# Patient Record
Sex: FEMALE | Race: BLACK OR AFRICAN AMERICAN | NOT HISPANIC OR LATINO | Employment: UNEMPLOYED | URBAN - METROPOLITAN AREA
[De-identification: names, ages, dates, MRNs, and addresses within clinical notes are randomized per-mention and may not be internally consistent; named-entity substitution may affect disease eponyms.]

---

## 2017-04-26 ENCOUNTER — OFFICE VISIT - HEALTHEAST (OUTPATIENT)
Dept: FAMILY MEDICINE | Facility: CLINIC | Age: 27
End: 2017-04-26

## 2017-04-26 DIAGNOSIS — N76.0 BACTERIAL VAGINOSIS: ICD-10-CM

## 2017-04-26 DIAGNOSIS — B96.89 BACTERIAL VAGINOSIS: ICD-10-CM

## 2017-04-26 DIAGNOSIS — B00.9 HSV-2 INFECTION: ICD-10-CM

## 2017-04-26 DIAGNOSIS — Z30.09 CONTRACEPTIVE EDUCATION: ICD-10-CM

## 2017-07-11 ENCOUNTER — COMMUNICATION - HEALTHEAST (OUTPATIENT)
Dept: SCHEDULING | Facility: CLINIC | Age: 27
End: 2017-07-11

## 2017-08-17 ENCOUNTER — OFFICE VISIT - HEALTHEAST (OUTPATIENT)
Dept: FAMILY MEDICINE | Facility: CLINIC | Age: 27
End: 2017-08-17

## 2017-08-17 ENCOUNTER — HOSPITAL ENCOUNTER (OUTPATIENT)
Dept: LAB | Age: 27
Setting detail: SPECIMEN
Discharge: HOME | End: 2017-08-17

## 2017-08-17 DIAGNOSIS — B96.89 BV (BACTERIAL VAGINOSIS): ICD-10-CM

## 2017-08-17 DIAGNOSIS — N89.8 VAGINAL DISCHARGE: ICD-10-CM

## 2017-08-17 DIAGNOSIS — N90.89 LESION OF LABIA: ICD-10-CM

## 2017-08-17 DIAGNOSIS — Z11.3 SCREEN FOR STD (SEXUALLY TRANSMITTED DISEASE): ICD-10-CM

## 2017-08-17 DIAGNOSIS — N76.0 BV (BACTERIAL VAGINOSIS): ICD-10-CM

## 2017-08-17 LAB
HBV SURFACE AG SERPL QL IA: NEGATIVE
HIV 1+2 AB+HIV1 P24 AG SERPL QL IA: NEGATIVE

## 2017-08-18 LAB
HSV1 IGG SERPL QL IA: POSITIVE
HSV2 IGG SERPL QL IA: ABNORMAL
SYPHILIS RPR SCREEN - HISTORICAL: NORMAL

## 2017-10-12 ENCOUNTER — COMMUNICATION - HEALTHEAST (OUTPATIENT)
Dept: FAMILY MEDICINE | Facility: CLINIC | Age: 27
End: 2017-10-12

## 2017-10-12 DIAGNOSIS — N90.89 LESION OF LABIA: ICD-10-CM

## 2018-01-31 ENCOUNTER — OFFICE VISIT - HEALTHEAST (OUTPATIENT)
Dept: FAMILY MEDICINE | Facility: CLINIC | Age: 28
End: 2018-01-31

## 2018-01-31 DIAGNOSIS — A60.00 GENITAL HERPES: ICD-10-CM

## 2018-01-31 DIAGNOSIS — Z11.3 SCREEN FOR STD (SEXUALLY TRANSMITTED DISEASE): ICD-10-CM

## 2018-01-31 LAB
CLUE CELLS: NORMAL
TRICHOMONAS, WET PREP: NORMAL
YEAST, WET PREP: NORMAL

## 2018-02-01 ENCOUNTER — COMMUNICATION - HEALTHEAST (OUTPATIENT)
Dept: FAMILY MEDICINE | Facility: CLINIC | Age: 28
End: 2018-02-01

## 2018-02-01 LAB
C TRACH DNA SPEC QL PROBE+SIG AMP: NEGATIVE
N GONORRHOEA DNA SPEC QL NAA+PROBE: NEGATIVE

## 2018-02-05 LAB
BKR LAB AP ABNORMAL BLEEDING: NO
BKR LAB AP BIRTH CONTROL/HORMONES: ABNORMAL
BKR LAB AP CERVICAL APPEARANCE: NORMAL
BKR LAB AP GYN ADEQUACY: ABNORMAL
BKR LAB AP GYN INTERPRETATION: ABNORMAL
BKR LAB AP HPV REFLEX: ABNORMAL
BKR LAB AP LMP: ABNORMAL
BKR LAB AP PATIENT STATUS: ABNORMAL
BKR LAB AP PREVIOUS ABNORMAL: ABNORMAL
BKR LAB AP PREVIOUS NORMAL: ABNORMAL
HIGH RISK?: NO
HPV SOURCE: ABNORMAL
HUMAN PAPILLOMA VIRUS 16 DNA: NEGATIVE
HUMAN PAPILLOMA VIRUS 18 DNA: NEGATIVE
HUMAN PAPILLOMA VIRUS FINAL DIAGNOSIS: ABNORMAL
HUMAN PAPILLOMA VIRUS OTHER HR: POSITIVE
PATH REPORT.COMMENTS IMP SPEC: ABNORMAL
RESULT FLAG (HE HISTORICAL CONVERSION): ABNORMAL
SPECIMEN DESCRIPTION: ABNORMAL

## 2018-02-07 ENCOUNTER — COMMUNICATION - HEALTHEAST (OUTPATIENT)
Dept: FAMILY MEDICINE | Facility: CLINIC | Age: 28
End: 2018-02-07

## 2018-06-21 ENCOUNTER — COMMUNICATION - HEALTHEAST (OUTPATIENT)
Dept: FAMILY MEDICINE | Facility: CLINIC | Age: 28
End: 2018-06-21

## 2018-07-25 LAB
ABORH_EXT (HISTORICAL CONVERSION): NORMAL
ANTIBODY_EXT (HISTORICAL CONVERSION): NORMAL
HBSAG_EXT (HISTORICAL CONVERSION): NEGATIVE
HIV_EXT: NEGATIVE
RPR - HISTORICAL: NORMAL
RUBELLA_EXT (HISTORICAL CONVERSION): NORMAL

## 2018-10-03 LAB
HGB_EXT (HISTORICAL CONVERSION): 10.9
PLT_EXT - HISTORICAL: 314

## 2018-11-01 ENCOUNTER — RECORDS - HEALTHEAST (OUTPATIENT)
Dept: ADMINISTRATIVE | Facility: OTHER | Age: 28
End: 2018-11-01

## 2018-11-28 ENCOUNTER — COMMUNICATION - HEALTHEAST (OUTPATIENT)
Dept: FAMILY MEDICINE | Facility: CLINIC | Age: 28
End: 2018-11-28

## 2018-12-10 ENCOUNTER — HOSPITAL ENCOUNTER (OUTPATIENT)
Dept: MEDSURG UNIT | Facility: CLINIC | Age: 28
Discharge: HOME | End: 2018-12-10
Attending: FAMILY MEDICINE | Admitting: FAMILY MEDICINE

## 2018-12-10 ASSESSMENT — MIFFLIN-ST. JEOR: SCORE: 1614

## 2019-01-10 ENCOUNTER — COMMUNICATION - HEALTHEAST (OUTPATIENT)
Dept: FAMILY MEDICINE | Facility: CLINIC | Age: 29
End: 2019-01-10

## 2019-01-14 ENCOUNTER — PRENATAL OFFICE VISIT - HEALTHEAST (OUTPATIENT)
Dept: FAMILY MEDICINE | Facility: CLINIC | Age: 29
End: 2019-01-14

## 2019-01-14 DIAGNOSIS — D57.3 SICKLE-CELL TRAIT (H): ICD-10-CM

## 2019-01-14 DIAGNOSIS — O34.219 PREVIOUS CESAREAN DELIVERY AFFECTING PREGNANCY: ICD-10-CM

## 2019-01-14 DIAGNOSIS — Z34.93 ENCOUNTER FOR SUPERVISION OF NORMAL PREGNANCY IN THIRD TRIMESTER: ICD-10-CM

## 2019-01-14 DIAGNOSIS — Z12.4 ENCOUNTER FOR SCREENING FOR MALIGNANT NEOPLASM OF CERVIX: ICD-10-CM

## 2019-01-14 DIAGNOSIS — N92.6 ABNORMAL MENSES: ICD-10-CM

## 2019-01-14 DIAGNOSIS — A60.00 GENITAL HERPES SIMPLEX, UNSPECIFIED SITE: ICD-10-CM

## 2019-01-14 DIAGNOSIS — F41.9 ANXIETY: ICD-10-CM

## 2019-01-14 DIAGNOSIS — Z32.01 PREGNANCY TEST POSITIVE: ICD-10-CM

## 2019-01-14 LAB
ALBUMIN UR-MCNC: NEGATIVE MG/DL
GLUCOSE UR STRIP-MCNC: NEGATIVE MG/DL
HCG UR QL: POSITIVE
KETONES UR STRIP-MCNC: NEGATIVE MG/DL

## 2019-01-14 ASSESSMENT — MIFFLIN-ST. JEOR: SCORE: 1624.21

## 2019-01-18 ENCOUNTER — COMMUNICATION - HEALTHEAST (OUTPATIENT)
Dept: FAMILY MEDICINE | Facility: CLINIC | Age: 29
End: 2019-01-18

## 2019-01-29 ENCOUNTER — PRENATAL OFFICE VISIT - HEALTHEAST (OUTPATIENT)
Dept: FAMILY MEDICINE | Facility: CLINIC | Age: 29
End: 2019-01-29

## 2019-01-29 DIAGNOSIS — O99.013 ANEMIA DURING PREGNANCY IN THIRD TRIMESTER: ICD-10-CM

## 2019-01-29 DIAGNOSIS — O34.219 PREVIOUS CESAREAN DELIVERY AFFECTING PREGNANCY: ICD-10-CM

## 2019-01-29 DIAGNOSIS — A60.00 GENITAL HERPES SIMPLEX, UNSPECIFIED SITE: ICD-10-CM

## 2019-01-29 DIAGNOSIS — K21.9 GASTROESOPHAGEAL REFLUX DISEASE, ESOPHAGITIS PRESENCE NOT SPECIFIED: ICD-10-CM

## 2019-01-29 DIAGNOSIS — B96.89 BACTERIAL VAGINITIS: ICD-10-CM

## 2019-01-29 DIAGNOSIS — N76.0 BACTERIAL VAGINITIS: ICD-10-CM

## 2019-01-29 LAB
FASTING STATUS PATIENT QL REPORTED: NO
GLUCOSE 1H P 50 G GLC PO SERPL-MCNC: 87 MG/DL (ref 70–139)
HGB BLD-MCNC: 9.7 G/DL (ref 12–16)

## 2019-01-30 LAB — T PALLIDUM AB SER QL: NEGATIVE

## 2019-01-31 ENCOUNTER — AMBULATORY - HEALTHEAST (OUTPATIENT)
Dept: FAMILY MEDICINE | Facility: CLINIC | Age: 29
End: 2019-01-31

## 2019-01-31 ENCOUNTER — COMMUNICATION - HEALTHEAST (OUTPATIENT)
Dept: FAMILY MEDICINE | Facility: CLINIC | Age: 29
End: 2019-01-31

## 2019-01-31 DIAGNOSIS — O99.013 ANEMIA DURING PREGNANCY IN THIRD TRIMESTER: ICD-10-CM

## 2019-02-22 ENCOUNTER — RECORDS - HEALTHEAST (OUTPATIENT)
Dept: ADMINISTRATIVE | Facility: OTHER | Age: 29
End: 2019-02-22

## 2019-02-26 ENCOUNTER — PRENATAL OFFICE VISIT - HEALTHEAST (OUTPATIENT)
Dept: FAMILY MEDICINE | Facility: CLINIC | Age: 29
End: 2019-02-26

## 2019-02-26 DIAGNOSIS — F41.1 GENERALIZED ANXIETY DISORDER: ICD-10-CM

## 2019-02-26 DIAGNOSIS — B96.89 BACTERIAL VAGINITIS: ICD-10-CM

## 2019-02-26 DIAGNOSIS — N76.0 BACTERIAL VAGINITIS: ICD-10-CM

## 2019-02-26 DIAGNOSIS — Z34.83 ENCOUNTER FOR SUPERVISION OF OTHER NORMAL PREGNANCY IN THIRD TRIMESTER: ICD-10-CM

## 2019-02-26 LAB
CLUE CELLS: NORMAL
TRICHOMONAS, WET PREP: NORMAL
YEAST, WET PREP: NORMAL

## 2019-02-27 ENCOUNTER — COMMUNICATION - HEALTHEAST (OUTPATIENT)
Dept: FAMILY MEDICINE | Facility: CLINIC | Age: 29
End: 2019-02-27

## 2019-03-06 ENCOUNTER — OFFICE VISIT - HEALTHEAST (OUTPATIENT)
Dept: FAMILY MEDICINE | Facility: CLINIC | Age: 29
End: 2019-03-06

## 2019-03-06 DIAGNOSIS — Z34.93 SUPERVISION OF NORMAL PREGNANCY IN THIRD TRIMESTER: ICD-10-CM

## 2019-03-06 LAB
ALBUMIN UR-MCNC: NEGATIVE MG/DL
GLUCOSE UR STRIP-MCNC: NEGATIVE MG/DL
KETONES UR STRIP-MCNC: NEGATIVE MG/DL

## 2019-03-07 LAB
ALLERGIC TO PENICILLIN: NO
GP B STREP DNA SPEC QL NAA+PROBE: POSITIVE

## 2019-03-14 ENCOUNTER — HOSPITAL ENCOUNTER (OUTPATIENT)
Dept: OBGYN | Facility: HOSPITAL | Age: 29
Discharge: HOME | End: 2019-03-14
Attending: FAMILY MEDICINE | Admitting: FAMILY MEDICINE

## 2019-03-14 LAB
AMPHETAMINES UR QL SCN: NORMAL
BARBITURATES UR QL: NORMAL
BENZODIAZ UR QL: NORMAL
CANNABINOIDS UR QL SCN: NORMAL
COCAINE UR QL: NORMAL
CREAT UR-MCNC: 180.7 MG/DL
ETHANOL UR CFM-MCNC: <10 MG/DL
METHADONE UR QL SCN: NORMAL
OPIATES UR QL SCN: NORMAL
OXYCODONE UR QL: NORMAL
PCP UR QL SCN: NORMAL

## 2019-03-14 ASSESSMENT — MIFFLIN-ST. JEOR: SCORE: 1632.15

## 2019-03-18 ENCOUNTER — ANESTHESIA - HEALTHEAST (OUTPATIENT)
Dept: OBGYN | Facility: HOSPITAL | Age: 29
End: 2019-03-18

## 2019-03-19 ENCOUNTER — SURGERY - HEALTHEAST (OUTPATIENT)
Dept: OBGYN | Facility: HOSPITAL | Age: 29
End: 2019-03-19

## 2019-03-19 ASSESSMENT — MIFFLIN-ST. JEOR: SCORE: 1632.15

## 2019-07-22 ENCOUNTER — OFFICE VISIT - HEALTHEAST (OUTPATIENT)
Dept: FAMILY MEDICINE | Facility: CLINIC | Age: 29
End: 2019-07-22

## 2019-07-22 DIAGNOSIS — Z12.4 SCREENING FOR MALIGNANT NEOPLASM OF CERVIX: ICD-10-CM

## 2019-10-25 ENCOUNTER — COMMUNICATION - HEALTHEAST (OUTPATIENT)
Dept: FAMILY MEDICINE | Facility: CLINIC | Age: 29
End: 2019-10-25

## 2019-11-18 ENCOUNTER — AMBULATORY - HEALTHEAST (OUTPATIENT)
Dept: FAMILY MEDICINE | Facility: CLINIC | Age: 29
End: 2019-11-18

## 2019-11-18 ENCOUNTER — COMMUNICATION - HEALTHEAST (OUTPATIENT)
Dept: FAMILY MEDICINE | Facility: CLINIC | Age: 29
End: 2019-11-18

## 2019-11-18 DIAGNOSIS — R87.811 ASCUS WITH POSITIVE HIGH RISK HUMAN PAPILLOMAVIRUS OF VAGINA: ICD-10-CM

## 2019-11-18 DIAGNOSIS — R87.620 ASCUS WITH POSITIVE HIGH RISK HUMAN PAPILLOMAVIRUS OF VAGINA: ICD-10-CM

## 2019-11-18 DIAGNOSIS — R87.810 ASCUS WITH POSITIVE HIGH RISK HPV CERVICAL: ICD-10-CM

## 2019-11-18 DIAGNOSIS — R87.610 ASCUS WITH POSITIVE HIGH RISK HPV CERVICAL: ICD-10-CM

## 2019-12-27 ENCOUNTER — RECORDS - HEALTHEAST (OUTPATIENT)
Dept: ADMINISTRATIVE | Facility: OTHER | Age: 29
End: 2019-12-27

## 2020-06-24 ENCOUNTER — OFFICE VISIT - HEALTHEAST (OUTPATIENT)
Dept: FAMILY MEDICINE | Facility: CLINIC | Age: 30
End: 2020-06-24

## 2020-06-24 DIAGNOSIS — N76.0 BACTERIAL VAGINOSIS: ICD-10-CM

## 2020-06-24 DIAGNOSIS — N89.8 VAGINAL DISCHARGE: ICD-10-CM

## 2020-06-24 DIAGNOSIS — Z98.890 HX OF INDUCED ABORTION: ICD-10-CM

## 2020-06-24 DIAGNOSIS — R87.610 ATYPICAL SQUAMOUS CELLS OF UNDETERMINED SIGNIFICANCE ON CYTOLOGIC SMEAR OF CERVIX (ASC-US): ICD-10-CM

## 2020-06-24 DIAGNOSIS — B96.89 BACTERIAL VAGINOSIS: ICD-10-CM

## 2020-06-24 LAB
CLUE CELLS: ABNORMAL
TRICHOMONAS, WET PREP: ABNORMAL
YEAST, WET PREP: ABNORMAL

## 2020-06-24 ASSESSMENT — MIFFLIN-ST. JEOR: SCORE: 1570.63

## 2020-06-25 ENCOUNTER — COMMUNICATION - HEALTHEAST (OUTPATIENT)
Dept: FAMILY MEDICINE | Facility: CLINIC | Age: 30
End: 2020-06-25

## 2020-06-26 ENCOUNTER — COMMUNICATION - HEALTHEAST (OUTPATIENT)
Dept: FAMILY MEDICINE | Facility: CLINIC | Age: 30
End: 2020-06-26

## 2020-06-26 LAB
C TRACH DNA SPEC QL PROBE+SIG AMP: NEGATIVE
N GONORRHOEA DNA SPEC QL NAA+PROBE: NEGATIVE

## 2020-08-17 ENCOUNTER — OFFICE VISIT - HEALTHEAST (OUTPATIENT)
Dept: FAMILY MEDICINE | Facility: CLINIC | Age: 30
End: 2020-08-17

## 2020-08-17 DIAGNOSIS — R87.610 ASCUS WITH POSITIVE HIGH RISK HPV CERVICAL: ICD-10-CM

## 2020-08-17 DIAGNOSIS — N89.8 VAGINAL DISCHARGE: ICD-10-CM

## 2020-08-17 DIAGNOSIS — B96.89 BACTERIAL VAGINOSIS: ICD-10-CM

## 2020-08-17 DIAGNOSIS — R87.810 ASCUS WITH POSITIVE HIGH RISK HPV CERVICAL: ICD-10-CM

## 2020-08-17 DIAGNOSIS — N76.0 BACTERIAL VAGINOSIS: ICD-10-CM

## 2020-08-17 LAB
CLUE CELLS: ABNORMAL
TRICHOMONAS, WET PREP: ABNORMAL
YEAST, WET PREP: ABNORMAL

## 2020-08-18 LAB
HPV SOURCE: ABNORMAL
HUMAN PAPILLOMA VIRUS 16 DNA: NEGATIVE
HUMAN PAPILLOMA VIRUS 18 DNA: NEGATIVE
HUMAN PAPILLOMA VIRUS FINAL DIAGNOSIS: ABNORMAL
HUMAN PAPILLOMA VIRUS OTHER HR: POSITIVE
SPECIMEN DESCRIPTION: ABNORMAL

## 2020-08-19 LAB
C TRACH DNA SPEC QL PROBE+SIG AMP: NEGATIVE
N GONORRHOEA DNA SPEC QL NAA+PROBE: NEGATIVE

## 2020-08-26 ENCOUNTER — COMMUNICATION - HEALTHEAST (OUTPATIENT)
Dept: FAMILY MEDICINE | Facility: CLINIC | Age: 30
End: 2020-08-26

## 2020-08-31 ENCOUNTER — COMMUNICATION - HEALTHEAST (OUTPATIENT)
Dept: FAMILY MEDICINE | Facility: CLINIC | Age: 30
End: 2020-08-31

## 2020-08-31 DIAGNOSIS — R87.610 ASCUS WITH POSITIVE HIGH RISK HPV CERVICAL: ICD-10-CM

## 2020-08-31 DIAGNOSIS — R87.810 ASCUS WITH POSITIVE HIGH RISK HPV CERVICAL: ICD-10-CM

## 2020-08-31 LAB
BKR LAB AP ABNORMAL BLEEDING: NO
BKR LAB AP BIRTH CONTROL/HORMONES: ABNORMAL
BKR LAB AP CERVICAL APPEARANCE: NORMAL
BKR LAB AP GYN ADEQUACY: ABNORMAL
BKR LAB AP GYN INTERPRETATION: ABNORMAL
BKR LAB AP GYN OTHER FINDINGS: ABNORMAL
BKR LAB AP HPV REFLEX: ABNORMAL
BKR LAB AP LMP: ABNORMAL
BKR LAB AP PATIENT STATUS: ABNORMAL
BKR LAB AP PREVIOUS ABNORMAL: ABNORMAL
BKR LAB AP PREVIOUS NORMAL: ABNORMAL
HIGH RISK?: NO
PATH REPORT.COMMENTS IMP SPEC: ABNORMAL
RESULT FLAG (HE HISTORICAL CONVERSION): ABNORMAL

## 2020-09-11 ENCOUNTER — OFFICE VISIT - HEALTHEAST (OUTPATIENT)
Dept: FAMILY MEDICINE | Facility: CLINIC | Age: 30
End: 2020-09-11

## 2020-09-11 DIAGNOSIS — Z01.818 PREOP EXAMINATION: ICD-10-CM

## 2020-09-11 DIAGNOSIS — D57.3 SICKLE-CELL TRAIT (H): ICD-10-CM

## 2020-09-11 LAB
ALBUMIN SERPL-MCNC: 4 G/DL (ref 3.5–5)
ALBUMIN UR-MCNC: NEGATIVE MG/DL
ALP SERPL-CCNC: 52 U/L (ref 45–120)
ALT SERPL W P-5'-P-CCNC: 10 U/L (ref 0–45)
ANION GAP SERPL CALCULATED.3IONS-SCNC: 11 MMOL/L (ref 5–18)
APPEARANCE UR: CLEAR
APTT PPP: 29 SECONDS (ref 24–37)
AST SERPL W P-5'-P-CCNC: 15 U/L (ref 0–40)
BACTERIA #/AREA URNS HPF: ABNORMAL HPF
BASOPHILS # BLD AUTO: 0 THOU/UL (ref 0–0.2)
BASOPHILS NFR BLD AUTO: 0 % (ref 0–2)
BILIRUB SERPL-MCNC: 0.4 MG/DL (ref 0–1)
BILIRUB UR QL STRIP: NEGATIVE
BUN SERPL-MCNC: 7 MG/DL (ref 8–22)
CALCIUM SERPL-MCNC: 9.9 MG/DL (ref 8.5–10.5)
CHLORIDE BLD-SCNC: 106 MMOL/L (ref 98–107)
CO2 SERPL-SCNC: 24 MMOL/L (ref 22–31)
COLOR UR AUTO: YELLOW
CREAT SERPL-MCNC: 1.08 MG/DL (ref 0.6–1.1)
EOSINOPHIL # BLD AUTO: 0.1 THOU/UL (ref 0–0.4)
EOSINOPHIL NFR BLD AUTO: 2 % (ref 0–6)
ERYTHROCYTE [DISTWIDTH] IN BLOOD BY AUTOMATED COUNT: 11.4 % (ref 11–14.5)
GFR SERPL CREATININE-BSD FRML MDRD: 60 ML/MIN/1.73M2
GLUCOSE BLD-MCNC: 106 MG/DL (ref 70–125)
GLUCOSE UR STRIP-MCNC: NEGATIVE MG/DL
HCG SERPL QL: NEGATIVE
HCT VFR BLD AUTO: 35.5 % (ref 35–47)
HGB BLD-MCNC: 11.6 G/DL (ref 12–16)
HGB UR QL STRIP: NEGATIVE
HIV 1+2 AB+HIV1 P24 AG SERPL QL IA: NEGATIVE
INR PPP: 1.09 (ref 0.9–1.1)
KETONES UR STRIP-MCNC: NEGATIVE MG/DL
LEUKOCYTE ESTERASE UR QL STRIP: ABNORMAL
LYMPHOCYTES # BLD AUTO: 1.6 THOU/UL (ref 0.8–4.4)
LYMPHOCYTES NFR BLD AUTO: 23 % (ref 20–40)
MCH RBC QN AUTO: 30.2 PG (ref 27–34)
MCHC RBC AUTO-ENTMCNC: 32.7 G/DL (ref 32–36)
MCV RBC AUTO: 93 FL (ref 80–100)
MONOCYTES # BLD AUTO: 0.4 THOU/UL (ref 0–0.9)
MONOCYTES NFR BLD AUTO: 6 % (ref 2–10)
NEUTROPHILS # BLD AUTO: 4.6 THOU/UL (ref 2–7.7)
NEUTROPHILS NFR BLD AUTO: 69 % (ref 50–70)
NITRATE UR QL: NEGATIVE
PH UR STRIP: 6.5 [PH] (ref 5–8)
PLATELET # BLD AUTO: 366 THOU/UL (ref 140–440)
PMV BLD AUTO: 7.2 FL (ref 7–10)
POTASSIUM BLD-SCNC: 4.5 MMOL/L (ref 3.5–5)
PROT SERPL-MCNC: 7.3 G/DL (ref 6–8)
RBC # BLD AUTO: 3.84 MILL/UL (ref 3.8–5.4)
RBC #/AREA URNS AUTO: ABNORMAL HPF
SODIUM SERPL-SCNC: 141 MMOL/L (ref 136–145)
SP GR UR STRIP: 1.02 (ref 1–1.03)
SQUAMOUS #/AREA URNS AUTO: ABNORMAL LPF
UROBILINOGEN UR STRIP-ACNC: ABNORMAL
WBC #/AREA URNS AUTO: ABNORMAL HPF
WBC: 6.7 THOU/UL (ref 4–11)

## 2020-09-11 ASSESSMENT — MIFFLIN-ST. JEOR: SCORE: 1551.06

## 2020-09-12 LAB — BACTERIA SPEC CULT: NO GROWTH

## 2020-09-14 ENCOUNTER — COMMUNICATION - HEALTHEAST (OUTPATIENT)
Dept: FAMILY MEDICINE | Facility: CLINIC | Age: 30
End: 2020-09-14

## 2020-09-14 LAB
ATRIAL RATE - MUSE: 63 BPM
DIASTOLIC BLOOD PRESSURE - MUSE: NORMAL
INTERPRETATION ECG - MUSE: NORMAL
P AXIS - MUSE: 61 DEGREES
PR INTERVAL - MUSE: 168 MS
QRS DURATION - MUSE: 82 MS
QT - MUSE: 386 MS
QTC - MUSE: 395 MS
R AXIS - MUSE: 72 DEGREES
SYSTOLIC BLOOD PRESSURE - MUSE: NORMAL
T AXIS - MUSE: 71 DEGREES
VENTRICULAR RATE- MUSE: 63 BPM

## 2020-10-07 ENCOUNTER — COMMUNICATION - HEALTHEAST (OUTPATIENT)
Dept: SCHEDULING | Facility: CLINIC | Age: 30
End: 2020-10-07

## 2020-10-16 ENCOUNTER — COMMUNICATION - HEALTHEAST (OUTPATIENT)
Dept: FAMILY MEDICINE | Facility: CLINIC | Age: 30
End: 2020-10-16

## 2020-10-16 ENCOUNTER — COMMUNICATION - HEALTHEAST (OUTPATIENT)
Dept: SCHEDULING | Facility: CLINIC | Age: 30
End: 2020-10-16

## 2020-10-16 DIAGNOSIS — R87.610 ASCUS WITH POSITIVE HIGH RISK HPV CERVICAL: ICD-10-CM

## 2020-10-16 DIAGNOSIS — R87.810 ASCUS WITH POSITIVE HIGH RISK HPV CERVICAL: ICD-10-CM

## 2021-01-07 ENCOUNTER — OFFICE VISIT - HEALTHEAST (OUTPATIENT)
Dept: FAMILY MEDICINE | Facility: CLINIC | Age: 31
End: 2021-01-07

## 2021-01-07 DIAGNOSIS — N76.0 BV (BACTERIAL VAGINOSIS): ICD-10-CM

## 2021-01-07 DIAGNOSIS — D57.3 SICKLE-CELL TRAIT (H): ICD-10-CM

## 2021-01-07 DIAGNOSIS — R35.0 URINARY FREQUENCY: ICD-10-CM

## 2021-01-07 DIAGNOSIS — B96.89 BV (BACTERIAL VAGINOSIS): ICD-10-CM

## 2021-01-07 DIAGNOSIS — Z11.3 SCREENING FOR VENEREAL DISEASE: ICD-10-CM

## 2021-01-07 LAB
ALBUMIN UR-MCNC: NEGATIVE MG/DL
APPEARANCE UR: ABNORMAL
BACTERIA #/AREA URNS HPF: ABNORMAL HPF
BILIRUB UR QL STRIP: NEGATIVE
CLUE CELLS: ABNORMAL
COLOR UR AUTO: YELLOW
GLUCOSE UR STRIP-MCNC: NEGATIVE MG/DL
HGB UR QL STRIP: NEGATIVE
KETONES UR STRIP-MCNC: NEGATIVE MG/DL
LEUKOCYTE ESTERASE UR QL STRIP: ABNORMAL
NITRATE UR QL: NEGATIVE
PH UR STRIP: 6.5 [PH] (ref 5–8)
RBC #/AREA URNS AUTO: ABNORMAL HPF
SP GR UR STRIP: 1.02 (ref 1–1.03)
SQUAMOUS #/AREA URNS AUTO: ABNORMAL LPF
TRICHOMONAS #/AREA URNS HPF: PRESENT /[HPF]
TRICHOMONAS, WET PREP: ABNORMAL
UROBILINOGEN UR STRIP-ACNC: ABNORMAL
WBC #/AREA URNS AUTO: ABNORMAL HPF
YEAST, WET PREP: ABNORMAL

## 2021-01-07 ASSESSMENT — MIFFLIN-ST. JEOR: SCORE: 1600.11

## 2021-01-08 LAB — BACTERIA SPEC CULT: NO GROWTH

## 2021-02-03 ENCOUNTER — COMMUNICATION - HEALTHEAST (OUTPATIENT)
Dept: OBGYN | Facility: CLINIC | Age: 31
End: 2021-02-03

## 2021-02-03 ENCOUNTER — COMMUNICATION - HEALTHEAST (OUTPATIENT)
Dept: FAMILY MEDICINE | Facility: CLINIC | Age: 31
End: 2021-02-03

## 2021-02-03 DIAGNOSIS — R87.810 ASCUS WITH POSITIVE HIGH RISK HPV CERVICAL: ICD-10-CM

## 2021-02-03 DIAGNOSIS — R87.610 ASCUS WITH POSITIVE HIGH RISK HPV CERVICAL: ICD-10-CM

## 2021-04-22 ENCOUNTER — OFFICE VISIT - HEALTHEAST (OUTPATIENT)
Dept: FAMILY MEDICINE | Facility: CLINIC | Age: 31
End: 2021-04-22

## 2021-04-22 DIAGNOSIS — Z30.431 IUD CHECK UP: ICD-10-CM

## 2021-04-22 DIAGNOSIS — N89.8 VAGINAL DISCHARGE: ICD-10-CM

## 2021-04-22 LAB
CLUE CELLS: ABNORMAL
TRICHOMONAS, WET PREP: ABNORMAL
YEAST, WET PREP: ABNORMAL

## 2021-04-22 ASSESSMENT — MIFFLIN-ST. JEOR: SCORE: 1612.18

## 2021-05-04 ENCOUNTER — HOSPITAL ENCOUNTER (OUTPATIENT)
Dept: ULTRASOUND IMAGING | Facility: CLINIC | Age: 31
Discharge: HOME | End: 2021-05-04

## 2021-05-04 DIAGNOSIS — Z30.431 IUD CHECK UP: ICD-10-CM

## 2021-05-17 ENCOUNTER — COMMUNICATION - HEALTHEAST (OUTPATIENT)
Dept: SCHEDULING | Facility: CLINIC | Age: 31
End: 2021-05-17

## 2021-05-30 VITALS — BODY MASS INDEX: 28.56 KG/M2 | WEIGHT: 169 LBS

## 2021-05-30 NOTE — PROGRESS NOTES
Post Partum Check:    Delivery at Unknown now .  Date of delivery: 3/19/19    Patient concerns: none    Bleeding: no concerns    Pain: no concerns    LMP:No LMP recorded.     Depression: no concerns  EPDS Score: 5    Contraception Plan:  OCPs.  Discussed use.  She declined LARCs    Immunizations needed:  none    Pap smear:  Completed today.  Normal  exam.    Assessment:  Well post partum check up.    Plan:  Follow up as needed.    1. Routine postpartum follow-up  2. Contraception  - norgestimate-ethinyl estradiol (ORTHO-CYCLEN) 0.25-35 mg-mcg per tablet; Take 1 tablet by mouth daily.  Dispense: 84 tablet; Refill: 3    3. Screening for malignant neoplasm of cervix  - Gynecologic Cytology (PAP Smear)

## 2021-05-31 ENCOUNTER — RECORDS - HEALTHEAST (OUTPATIENT)
Dept: ADMINISTRATIVE | Facility: CLINIC | Age: 31
End: 2021-05-31

## 2021-05-31 VITALS — BODY MASS INDEX: 29.41 KG/M2 | WEIGHT: 174 LBS

## 2021-06-01 ENCOUNTER — RECORDS - HEALTHEAST (OUTPATIENT)
Dept: ADMINISTRATIVE | Facility: CLINIC | Age: 31
End: 2021-06-01

## 2021-06-01 VITALS — WEIGHT: 174 LBS | BODY MASS INDEX: 29.41 KG/M2

## 2021-06-02 VITALS — BODY MASS INDEX: 33.36 KG/M2 | WEIGHT: 200.25 LBS | HEIGHT: 65 IN

## 2021-06-02 VITALS — BODY MASS INDEX: 34.28 KG/M2 | WEIGHT: 206 LBS

## 2021-06-02 VITALS — HEIGHT: 65 IN | BODY MASS INDEX: 33.66 KG/M2 | WEIGHT: 202 LBS

## 2021-06-02 VITALS — BODY MASS INDEX: 32.99 KG/M2 | WEIGHT: 198 LBS | HEIGHT: 65 IN

## 2021-06-02 VITALS — BODY MASS INDEX: 33.13 KG/M2 | WEIGHT: 199.08 LBS

## 2021-06-02 VITALS — BODY MASS INDEX: 33.66 KG/M2 | HEIGHT: 65 IN | WEIGHT: 202 LBS

## 2021-06-02 VITALS — WEIGHT: 202.08 LBS | BODY MASS INDEX: 33.63 KG/M2

## 2021-06-03 VITALS — WEIGHT: 187 LBS | BODY MASS INDEX: 31.12 KG/M2

## 2021-06-03 NOTE — TELEPHONE ENCOUNTER
I have been unable to reach this patient by phone.  A letter is being sent to the last known home address.

## 2021-06-03 NOTE — TELEPHONE ENCOUNTER
"Attempt to call pt, Phone number not accepting calls at the moment will try again later.#1  \" Okay to relay message\"      "

## 2021-06-03 NOTE — TELEPHONE ENCOUNTER
"----- Message from Stephan Dotson MD sent at 11/18/2019  1:57 PM CST -----  Call:  Pap results from a long time ago, I came back to review them now.  Mildly abnormal pap with some HPV that is not \"numbered\" but still considered higher risk HPV.  Should have colposcopy with obgyn (like Dr. Berger)  We can help schedule.  "

## 2021-06-04 VITALS
SYSTOLIC BLOOD PRESSURE: 99 MMHG | BODY MASS INDEX: 31.62 KG/M2 | TEMPERATURE: 98 F | RESPIRATION RATE: 16 BRPM | HEART RATE: 76 BPM | WEIGHT: 190 LBS | DIASTOLIC BLOOD PRESSURE: 64 MMHG

## 2021-06-04 VITALS
BODY MASS INDEX: 31.39 KG/M2 | HEIGHT: 65 IN | HEART RATE: 88 BPM | TEMPERATURE: 97.9 F | OXYGEN SATURATION: 97 % | WEIGHT: 188.44 LBS | DIASTOLIC BLOOD PRESSURE: 58 MMHG | SYSTOLIC BLOOD PRESSURE: 118 MMHG

## 2021-06-05 VITALS
TEMPERATURE: 98.1 F | WEIGHT: 198.44 LBS | DIASTOLIC BLOOD PRESSURE: 62 MMHG | BODY MASS INDEX: 33.88 KG/M2 | HEART RATE: 100 BPM | HEIGHT: 64 IN | RESPIRATION RATE: 18 BRPM | SYSTOLIC BLOOD PRESSURE: 116 MMHG

## 2021-06-05 VITALS
SYSTOLIC BLOOD PRESSURE: 102 MMHG | BODY MASS INDEX: 34.33 KG/M2 | WEIGHT: 201.1 LBS | DIASTOLIC BLOOD PRESSURE: 56 MMHG | HEIGHT: 64 IN | OXYGEN SATURATION: 98 % | HEART RATE: 62 BPM

## 2021-06-05 VITALS
WEIGHT: 185 LBS | DIASTOLIC BLOOD PRESSURE: 75 MMHG | RESPIRATION RATE: 16 BRPM | HEART RATE: 78 BPM | HEIGHT: 65 IN | TEMPERATURE: 98.2 F | SYSTOLIC BLOOD PRESSURE: 101 MMHG | BODY MASS INDEX: 30.82 KG/M2

## 2021-06-10 NOTE — TELEPHONE ENCOUNTER
New Appointment Needed  What is the reason for the visit:    Pre-Op Appt Request  When is the surgery? :  9/25  Where is the surgery?:  [facility] in [facility]  Who is the surgeon? :  Dr. Owens  What type of surgery is being done?: 360 BBL   Provider Preference: Any available  How soon do you need to be seen?: before 9/25 - out of state surgery  Waitlist offered?: No  Okay to leave a detailed message:  Yes

## 2021-06-10 NOTE — TELEPHONE ENCOUNTER
Who is calling:  Patient   Reason for Call:  Patient was calling back to schedule her Pre-Op physical  Patient states she is currently scheduled on 09/25/20 as the [facility] [place] with Dr. Yu Owens for a 360 BBL procedure  Patient also states she would prefer to see either Dr. Dotson or Dr. Rodney for this pre-op appointment if possible and is currently out of town (will be back in town next week).  Date of last appointment with primary care:   Okay to leave a detailed message: Yes

## 2021-06-10 NOTE — PROGRESS NOTES
ASSESSMENT/PLAN:  1. ASCUS with positive high risk HPV cervical  Gynecologic Cytology (PAP Smear)    HPV High Risk DNA Cervical   2. Vaginal discharge  Wet Prep, Vaginal    Chlamydia trachomatis & Neisseria gonorrhoeae, Amplified Detection   3. Bacterial vaginosis  metroNIDAZOLE (FLAGYL) 500 MG tablet    metroNIDAZOLE (METROGEL) 0.75 % vaginal gel       This is a 31 yo female with:  1.  ASCUS pap smear with positive high risk HPV - here for follow up pap smear - pap/HPV done/sent.  2.  Vaginal discharge - patient is frustrated by what she perceives as bacterial vaginosis.  Review of records suggests positive clue cells in April and .  We discussed this and treatment of recurrent BV.  Will check GC/Chlamydia as well as wet prep.  3.  Bacterial vaginosis - patient has another positive test for clue cells.  Will treat with Metronidazole 500 mg po two times a day x 7 days, then Metronidazole intravaginally at bedtime twice weekly.     Return in about 3 months (around 2020) for Recheck.      There are no discontinued medications.  There are no Patient Instructions on file for this visit.    Chief Complaint:  Chief Complaint   Patient presents with     possible bacterial vaginanosis       HPI:   Kitty Bueno is a 30 y.o. female c/o  Same partner x 8 years  Having recurrent BV      PMH:   Patient Active Problem List    Diagnosis Date Noted     39 weeks gestation of pregnancy 2019     Anemia during pregnancy in third trimester 2019     Encounter for supervision of normal pregnancy in third trimester 2019     Family history of genetic disorder 2018     Previous  delivery affecting pregnancy 2015     Genital herpes 2015     Anemia 2015     Anxiety 2015     Abnormal Pap 2014     Nondependent cannabis abuse 2010     Sickle-cell trait (H) 2010     Past Medical History:   Diagnosis Date     Asthma      Chlamydia      History of transfusion 2014     When her IUD came out     Mental disorder     Anxiety     Pregnancy complicated by female genital mutilation 2015     Urinary tract infection      Past Surgical History:   Procedure Laterality Date      SECTION      baby's heart rate dropped      SECTION      repeat      SECTION N/A 3/19/2019    Procedure: REPEAT  SECTION,WITH TUBAL LIGATION;  Surgeon: Tino Berger MD;  Location: [facility];  Service: Obstetrics      SECTION, LOW TRANSVERSE      3 cesareans. last      Social History     Socioeconomic History     Marital status: Single     Spouse name: Not on file     Number of children: Not on file     Years of education: Not on file     Highest education level: Not on file   Occupational History     Occupation: janitorial - cleans offices (part time)   Social Needs     Financial resource strain: Not on file     Food insecurity     Worry: Not on file     Inability: Not on file     Transportation needs     Medical: Not on file     Non-medical: Not on file   Tobacco Use     Smoking status: Never Smoker     Smokeless tobacco: Never Used     Tobacco comment: no exposure   Substance and Sexual Activity     Alcohol use: No     Frequency: Never     Comment: rarely     Drug use: No     Sexual activity: Yes     Partners: Male     Birth control/protection: None   Lifestyle     Physical activity     Days per week: Not on file     Minutes per session: Not on file     Stress: Not on file   Relationships     Social connections     Talks on phone: Not on file     Gets together: Not on file     Attends [employer] service: Not on file     Active member of club or organization: Not on file     Attends meetings of clubs or organizations: Not on file     Relationship status: Not on file     Intimate partner violence     Fear of current or ex partner: Not on file     Emotionally abused: Not on file     Physically abused: Not on file     Forced sexual activity: Not on  file   Other Topics Concern     Not on file   Social History Narrative    Lives with baby and older 2 children     Family History   Problem Relation Age of Onset     Sickle cell anemia Daughter      Other Daughter         Anophthalmia     No Medical Problems Mother      No Medical Problems Father      No Medical Problems Sister      No Medical Problems Brother      No Medical Problems Sister      No Medical Problems Sister        Meds:    Current Outpatient Medications:      acyclovir (ZOVIRAX) 400 MG tablet, Take 1 tablet (400 mg total) by mouth 3 (three) times a day., Disp: 90 tablet, Rfl: 1     calcium, as carbonate, (TUMS) 200 mg calcium (500 mg) chewable tablet, Chew 1 tablet (200 mg total) daily., Disp: 90 tablet, Rfl: 3     FLUoxetine (PROZAC) 20 MG capsule, Take 1 capsule (20 mg total) by mouth daily., Disp: 60 capsule, Rfl: 2     hydrOXYzine HCl (ATARAX) 25 MG tablet, Take 1 tablet (25 mg total) by mouth 3 (three) times a day as needed for itching., Disp: 90 tablet, Rfl: 0     hydrOXYzine pamoate (VISTARIL) 50 MG capsule, Take 1 capsule (50 mg total) by mouth at bedtime as needed, may repeat once., Disp: 30 capsule, Rfl: 0     ibuprofen (ADVIL,MOTRIN) 800 MG tablet, Take 1 tablet (800 mg total) by mouth every 6 (six) hours., Disp: 30 tablet, Rfl: 0     metroNIDAZOLE (FLAGYL) 500 MG tablet, Take 1 tablet (500 mg total) by mouth 2 (two) times a day for 7 days., Disp: 14 tablet, Rfl: 0     [START ON 8/24/2020] metroNIDAZOLE (METROGEL) 0.75 % vaginal gel, 1 applicatorful intravaginally twice weekly x 8 weeks total, Disp: 140 g, Rfl: 1     norgestimate-ethinyl estradiol (ORTHO-CYCLEN) 0.25-35 mg-mcg per tablet, Take 1 tablet by mouth daily., Disp: 84 tablet, Rfl: 3     oxyCODONE (ROXICODONE) 5 MG immediate release tablet, Take 1 tablet (5 mg total) by mouth every 6 (six) hours as needed., Disp: 13 tablet, Rfl: 0     prenatal no115-iron-folic acid 29 mg iron- 1 mg Chew, Chew daily., Disp: , Rfl:      ranitidine  (ZANTAC) 150 MG tablet, Take 1 tablet (150 mg total) by mouth at bedtime., Disp: 30 tablet, Rfl: 1    Allergies:  No Known Allergies    ROS:  Pertinent positives as noted in HPI; otherwise 12 point ROS negative.      Physical Exam:  EXAM:  BP 99/64 (Patient Site: Right Arm, Patient Position: Sitting, Cuff Size: Adult Large)   Pulse 76   Temp 98  F (36.7  C) (Tympanic)   Resp 16   Wt 190 lb (86.2 kg)   LMP 08/01/2020 (Exact Date)   BMI 31.62 kg/m     Gen:  NAD, appears well, well-hydrated  HEENT:  TMs nl, oropharynx benign, nasal mucosa nl, conjunctiva clear  Neck:  Supple, no adenopathy, no thyromegaly, no carotid bruits, no JVD  Lungs:  Clear to auscultation bilaterally  Cor:  RRR no murmur  Abd:  Soft, nontender, BS+, no masses, no guarding or rebound, no HSM  PELVIC EXAM:External genitalia: normal  Vaginal mucosa normal  Vaginal discharge: white discharge  Speculum exam shows a normal appearing cervix .   Bimanual exam: Cervix closed, firm, non tender  to motion.  Uterus  firm, regular, mobile, non tender to palpation. No adnexal masses or tenderness.   Extr:  Neg.  Neuro:  No asymmetry  Skin:  Warm/dry        Results:  Results for orders placed or performed in visit on 08/17/20   Wet Prep, Vaginal    Specimen: Vaginal; Genital   Result Value Ref Range    Yeast Result No yeast seen No yeast seen    Trichomonas No Trichomonas seen No Trichomonas seen    Clue Cells, Wet Prep Clue cells seen (!) No Clue cells seen

## 2021-06-10 NOTE — PROGRESS NOTES
Subjective:  27 y.o. female with concerns of break out of genital HSV.  First incident about 3-4 years ago.  Non since.  Has itching and pain.  Has been under increased stress.  Thinks this may have precipitated outbreak.    Also was interested in contraception.  Originally wanted depo provera.  UPT was negative.  Discussed side effects.  At that point, she decided she did not want the shot.  She is not regularly sexually active now.  Discussed LARC.  She did not want any of those.    Also, she is having recurrent BV.  Has had many cases of it and treated with oral metronidazole.      History   Smoking Status     Never Smoker   Smokeless Tobacco     Never Used     Comment: no exposure      Objective:  /60 (Patient Site: Right Arm, Patient Position: Sitting, Cuff Size: Adult Regular)  Pulse 80  Temp 98.7  F (37.1  C) (Oral)   Wt 169 lb (76.7 kg)  LMP 04/20/2017 (Approximate)  Breastfeeding? No  BMI 28.56 kg/m2  GENERAL: alert, not distressed  CHEST: clear, no rales, rhonchi, or wheezes  CARDIAC: regular without murmur    Recent Results (from the past 24 hour(s))   Pregnancy, Urine   Result Value Ref Range    Pregnancy Test, Urine Negative Negative    Specific Gravity, UA 1.015 1.001 - 1.030      Assessment and Plan:   1. Contraceptive education  2. HSV-2 infection  Discussed episodic treatment versus chronic suppression.  She chose the former.  - valACYclovir (VALTREX) 500 MG tablet; Take 1 tablet (500 mg total) by mouth 2 (two) times a day for 3 days.  Dispense: 6 tablet; Refill: 0    3. Bacterial vaginosis  Up to date listed twice weekly vaginal metronidazole gel twice weekly for four to six months as a suppressive therapy.  She wanted to try that.  - metroNIDAZOLE (METROGEL VAGINAL) 0.75 % vaginal gel; Insert into the vagina 2 (two) times a week.  Dispense: 140 g; Refill: 3

## 2021-06-11 NOTE — PROGRESS NOTES
[address] [address]  Dept: [phone]  Dept Fax: [phone]  Primary Provider: Stephan Dotson MD  Pre-op Performing Provider: NATHAN CHANDRA    PREOPERATIVE EVALUATION:  Today's date: 9/11/2020    Kitty Bueno is a 30 y.o. female who presents for a preoperative evaluation.    Surgical Information:  Surgery Details 9/11/2020   Surgery/Procedure: BBL   Surgery Location: [facility]   Surgeon: Dr. Owens   Surgery Date: 9/25/2020   Time of Surgery: N/A   Where patient plans to recover: at home with family     Fax number for surgical facility: [phone]  Type of Anesthesia Anticipated: general  Subjective     HPI related to upcoming procedure:   Patient desires cosmetic surgery   Needs medical clearance for surgery       Preop Questions 9/11/2020   Have you ever had a heart attack or stroke? No   Have you ever had surgery on your heart or blood vessels, such as a stent placement, a coronary artery bypass, or surgery on an artery in your head, neck, heart, or legs? No   Do you have chest pain with activity? No   Do you have a history of  heart failure? No   Do you currently have a cold, bronchitis or symptoms of other infection? No   Do you have a cough, shortness of breath, or wheezing? No   Do you or anyone in your family have previous history of blood clots? No   Do you or does anyone in your family have a serious bleeding problem such as prolonged bleeding following surgeries or cuts? No   Have you ever had problems with anemia or been told to take iron pills? YES - h/o iron deficiency anemia - associated with pregnancies; sickle cell trait   Have you had any abnormal blood loss such as black, tarry or bloody stools, or abnormal vaginal bleeding? No   Have you ever had a blood transfusion? YES - 2013, related to dysfunctional uterine bleeding with Mirena IUD   Have you ever had a transfusion reaction? No   Are you willing to have a blood transfusion  if it is medically needed before, during, or after your surgery? Yes   Have you or any of your relatives ever had problems with anesthesia? No   Do you have sleep apnea, excessive snoring or daytime drowsiness? No   Do you have any artifical heart valves or other implanted medical devices like a pacemaker, defibrillator, or continuous glucose monitor? No   Do you have artificial joints? No   Are you allergic to latex? No   Is there any chance that you may be pregnant? No         Patient does not have a Health Care Directive or Living Will: Discussed advance care planning with patient; however, patient declined at this time.    RX [place] ([place] reviewed:  reviewed - no concerns    ANEMIA - Patient has a recent history of moderate-severe anemia, which has not been symptomatic. Work up to date has revealed previous episodes of anemia.  Lab Results   Component Value Date    HGB 11.6 (L) 09/11/2020     Current hemoglobin is 11.6 . Treatment has been transfusion in remote past.       Review of Systems  CONSTITUTIONAL: NEGATIVE for fever, chills, change in weight  INTEGUMENTARY/SKIN: NEGATIVE for worrisome rashes, moles or lesions  EYES: NEGATIVE for vision changes or irritation  ENT/MOUTH: NEGATIVE for ear, mouth and throat problems  RESP: NEGATIVE for significant cough or SOB  BREAST: NEGATIVE for masses, tenderness or discharge  CV: NEGATIVE for chest pain, palpitations or peripheral edema  GI: NEGATIVE for nausea, abdominal pain, heartburn, or change in bowel habits  : NEGATIVE for frequency, dysuria, or hematuria  MUSCULOSKELETAL: NEGATIVE for significant arthralgias or myalgia  NEURO: NEGATIVE for weakness, dizziness or paresthesias  ENDOCRINE: NEGATIVE for temperature intolerance, skin/hair changes  HEME: NEGATIVE for bleeding problems  PSYCHIATRIC: NEGATIVE for changes in mood or affect    Patient Active Problem List    Diagnosis Date Noted     ASCUS with positive high risk HPV cervical 08/31/2020      39 weeks gestation of pregnancy 2019     Anemia during pregnancy in third trimester 2019     Encounter for supervision of normal pregnancy in third trimester 2019     Family history of genetic disorder 2018     Previous  delivery affecting pregnancy 2015     Genital herpes 2015     Anemia 2015     Anxiety 2015     Abnormal Pap 2014     Nondependent cannabis abuse 2010     Sickle-cell trait (H) 2010     Past Medical History:   Diagnosis Date     ASCUS with positive high risk HPV cervical 2019     Asthma      Chlamydia      History of transfusion 2014    When her IUD came out     Mental disorder     Anxiety     Pregnancy complicated by female genital mutilation 2015     Urinary tract infection      Past Surgical History:   Procedure Laterality Date      SECTION      baby's heart rate dropped      SECTION      repeat      SECTION N/A 3/19/2019    Procedure: REPEAT  SECTION,WITH TUBAL LIGATION;  Surgeon: Tino Berger MD;  Location: [facility];  Service: Obstetrics      SECTION, LOW TRANSVERSE      3 cesareans. last      Current Outpatient Medications   Medication Sig Dispense Refill     metroNIDAZOLE (METROGEL) 0.75 % vaginal gel 1 applicatorful intravaginally twice weekly x 8 weeks total 140 g 1     No current facility-administered medications for this visit.        No Known Allergies    Social History     Tobacco Use     Smoking status: Never Smoker     Smokeless tobacco: Never Used     Tobacco comment: no exposure   Substance Use Topics     Alcohol use: No     Frequency: Never     Comment: rarely      Family History   Problem Relation Age of Onset     Sickle cell anemia Daughter      Other Daughter         Anophthalmia     No Medical Problems Mother      No Medical Problems Father      No Medical Problems Sister      No Medical Problems Brother      No Medical  "Problems Sister      No Medical Problems Sister      Social History     Substance and Sexual Activity   Drug Use No           Objective   /75 (Patient Site: Right Arm, Patient Position: Sitting, Cuff Size: Adult Regular)   Pulse 78   Temp 98.2  F (36.8  C) (Tympanic)   Resp 16   Ht 5' 4.75\" (1.645 m)   Wt 185 lb (83.9 kg)   LMP 08/25/2020 (Exact Date)   BMI 31.02 kg/m    Physical Exam    GENERAL APPEARANCE: healthy, alert and no distress     EYES: EOMI, PERRL     HENT: ear canals and TM's normal and nose and mouth without ulcers or lesions     NECK: no adenopathy, no asymmetry, masses, or scars and thyroid normal to palpation     RESP: lungs clear to auscultation - no rales, rhonchi or wheezes     BREAST: normal without masses, tenderness or nipple discharge and no palpable axillary masses or adenopathy     CV: regular rates and rhythm, normal S1 S2, no S3 or S4 and no murmur, click or rub     ABDOMEN:  soft, nontender, no HSM or masses and bowel sounds normal     MS: extremities normal- no gross deformities noted, no evidence of inflammation in joints, FROM in all extremities.     SKIN: no suspicious lesions or rashes     NEURO: Normal strength and tone, sensory exam grossly normal, mentation intact and speech normal     PSYCH: mentation appears normal. and affect normal/bright     LYMPHATICS: No cervical adenopathy    Recent Labs   Lab Test 09/11/20  1329 03/22/19  0009   HGB 11.6* 9.7*     --    INR 1.09  --      --    K 4.5  --    CREATININE 1.08  --         PRE-OP Diagnostics:   Recent Results (from the past 168 hour(s))   Electrocardiogram Perform and Read    Collection Time: 09/11/20 12:48 PM   Result Value Ref Range    SYSTOLIC BLOOD PRESSURE      DIASTOLIC BLOOD PRESSURE      VENTRICULAR RATE 63 BPM    ATRIAL RATE 63 BPM    P-R INTERVAL 168 ms    QRS DURATION 82 ms    Q-T INTERVAL 386 ms    QTC CALCULATION (BEZET) 395 ms    P Axis 61 degrees    R AXIS 72 degrees    T AXIS 71 " degrees    MUSE DIAGNOSIS       Normal sinus rhythm with sinus arrhythmia  Normal ECG  No previous ECGs available     Comprehensive Metabolic Panel    Collection Time: 09/11/20  1:29 PM   Result Value Ref Range    Sodium 141 136 - 145 mmol/L    Potassium 4.5 3.5 - 5.0 mmol/L    Chloride 106 98 - 107 mmol/L    CO2 24 22 - 31 mmol/L    Anion Gap, Calculation 11 5 - 18 mmol/L    Glucose 106 70 - 125 mg/dL    BUN 7 (L) 8 - 22 mg/dL    Creatinine 1.08 0.60 - 1.10 mg/dL    GFR MDRD Af Amer >60 >60 mL/min/1.73m2    GFR MDRD Non Af Amer 60 (L) >60 mL/min/1.73m2    Bilirubin, Total 0.4 0.0 - 1.0 mg/dL    Calcium 9.9 8.5 - 10.5 mg/dL    Protein, Total 7.3 6.0 - 8.0 g/dL    Albumin 4.0 3.5 - 5.0 g/dL    Alkaline Phosphatase 52 45 - 120 U/L    AST 15 0 - 40 U/L    ALT 10 0 - 45 U/L   APTT(PTT)    Collection Time: 09/11/20  1:29 PM   Result Value Ref Range    PTT 29 24 - 37 seconds   INR    Collection Time: 09/11/20  1:29 PM   Result Value Ref Range    INR 1.09 0.90 - 1.10   Beta-hCG, Qualitative, Serum    Collection Time: 09/11/20  1:29 PM   Result Value Ref Range    Beta hCG Qualitative Negative Negative   HIV Antigen/Antibody Screening [place]    Collection Time: 09/11/20  1:29 PM   Result Value Ref Range    HIV Antigen / Antibody Negative Negative   HM1 (CBC with Diff)    Collection Time: 09/11/20  1:29 PM   Result Value Ref Range    WBC 6.7 4.0 - 11.0 thou/uL    RBC 3.84 3.80 - 5.40 mill/uL    Hemoglobin 11.6 (L) 12.0 - 16.0 g/dL    Hematocrit 35.5 35.0 - 47.0 %    MCV 93 80 - 100 fL    MCH 30.2 27.0 - 34.0 pg    MCHC 32.7 32.0 - 36.0 g/dL    RDW 11.4 11.0 - 14.5 %    Platelets 366 140 - 440 thou/uL    MPV 7.2 7.0 - 10.0 fL    Neutrophils % 69 50 - 70 %    Lymphocytes % 23 20 - 40 %    Monocytes % 6 2 - 10 %    Eosinophils % 2 0 - 6 %    Basophils % 0 0 - 2 %    Neutrophils Absolute 4.6 2.0 - 7.7 thou/uL    Lymphocytes Absolute 1.6 0.8 - 4.4 thou/uL    Monocytes Absolute 0.4 0.0 - 0.9 thou/uL    Eosinophils Absolute 0.1 0.0  - 0.4 thou/uL    Basophils Absolute 0.0 0.0 - 0.2 thou/uL   Urinalysis-UC if Indicated    Collection Time: 09/11/20  1:56 PM   Result Value Ref Range    Color, UA Yellow Colorless, Yellow, Straw, Light Yellow    Clarity, UA Clear Clear    Glucose, UA Negative Negative    Bilirubin, UA Negative Negative    Ketones, UA Negative Negative    Specific Gravity, UA 1.020 1.005 - 1.030    Blood, UA Negative Negative    pH, UA 6.5 5.0 - 8.0    Protein, UA Negative Negative mg/dL    Urobilinogen, UA 0.2 E.U./dL 0.2 E.U./dL, 1.0 E.U./dL    Nitrite, UA Negative Negative    Leukocytes, UA Trace (!) Negative    Bacteria, UA Few (!) None Seen hpf    RBC, UA 0-2 None Seen, 0-2 hpf    WBC, UA 0-5 None Seen, 0-5 hpf    Squam Epithel, UA 0-5 None Seen, 0-5 lpf   Culture, Urine    Collection Time: 09/11/20  1:56 PM    Specimen: Urine   Result Value Ref Range    Culture No Growth      EKG: appears normal, NSR, as noted in results above         Assessment & Plan   1. Preop examination  HM1(CBC and Differential)    Comprehensive Metabolic Panel    APTT(PTT)    INR    Beta-hCG, Qualitative, Serum    HIV Antigen/Antibody Screening [place]    Electrocardiogram Perform and Read    Urinalysis-UC if Indicated    HM1 (CBC with Diff)    Culture, Urine   2. Sickle-cell trait (H)       This is a 29 yo female who desires cosmetic procedure.  Workup ordered per surgeon's request.  She has some h/o anxiety.  She has had recent fairly chronic bacterial vaginosis.  Otherwise, takes NO medications.  Clinically stable.  OK for surgery.      The proposed surgical procedure is considered LOW risk.    REVISED CARDIAC RISK INDEX   The patient has the following serious cardiovascular risks for perioperative complications:  No serious cardiac risks = 0 points    INTERPRETATION: 0 points: Class I (very low risk - 0.4% complication rate)        The patient has the following additional risks and recommendations for perioperative complications:     - No identified  additional risk factors other than previously addressed     MEDICATION INSTRUCTIONS:  Patient is on no chronic medications    RECOMMENDATION:  APPROVAL GIVEN to proceed with proposed procedure, without further diagnostic evaluation.    No follow-ups on file.    Signed Electronically by: Zakia Michaels MD    Copy of this evaluation report is provided to requesting physician.    [facility]    Revised Cardiac Risk Index

## 2021-06-11 NOTE — TELEPHONE ENCOUNTER
Name of form/paperwork: Other:  Pre op  Date form received by clinic:  09/11/2020  When is the form needed by? ASAP  Patient Notified form requests are processed in 3-5 business days: Yes  (If patient needs for sooner, please note that in this message)  Okay to leave a detailed message: Yes     Needs pre op and labs faxed.    Surgery/Procedure: [facility]  Surgery Location: [facility]  Surgeon: Dr. Owens  Surgery Date: 9/25/2020  fax:  [phone]

## 2021-06-11 NOTE — PROGRESS NOTES
2/13/14 NIL pap  2/16/16 NIL pap, neg HPV  1/31/18 ASCUS, +HR HPV (not 16/18)  11/1/18 NIL pap  7/22/19 ASCUS, +HR HPV (not 16/18). Colposcopy recommended  8/17/20 LSIL, +HR HPV (not 16/18). Plan: colposcopy, due before 11/17/20

## 2021-06-12 NOTE — PROGRESS NOTES
Chief Complaint   Patient presents with     Vaginal Discharge     admit odor, ithciness, thicker white discharge. 3x weeks ago.       HPI    Patient is here for 3 wks of whitish vaginal discharge with mild itching and odor. She also would like to be tested for STDs although she denied recent exposures. She reported also a single lesion with mild pain at left medial labia minora. No abdominal pain,urinary symptoms.     ROS: Pertinent ROS noted in HPI.     No Known Allergies    Patient Active Problem List   Diagnosis     Sickle-cell trait     Nondependent cannabis abuse     Abnormal Pap     Genital herpes     Anemia     Anxiety     Previous  delivery affecting pregnancy       Family History   Problem Relation Age of Onset     Sickle cell anemia Daughter        Social History     Social History     Marital status: Single     Spouse name: N/A     Number of children: N/A     Years of education: N/A     Occupational History     janitorial - cleans offices (part time)      Social History Main Topics     Smoking status: Never Smoker     Smokeless tobacco: Never Used      Comment: no exposure     Alcohol use Yes      Comment: rarely     Drug use: No     Sexual activity: Yes     Partners: Male     Birth control/ protection: None     Other Topics Concern     Not on file     Social History Narrative    Lives with baby and older 2 children         Objective:    Vitals:    17 1846   BP: 106/80   Pulse: 79   Resp: 18   Temp: 98.4  F (36.9  C)   SpO2: 100%       Gen:NAD  : There is a 2 mm indurated, erythematous lesion without pustules nor vesicle at medial left labia minora. Small whitish vaginal discharge. Cervix normal. Exam done in presence of Stacey.    Recent Results (from the past 24 hour(s))   Wet Prep, Vaginal   Result Value Ref Range    Yeast Result No yeast seen No yeast seen    Trichomonas No Trichomonas seen No Trichomonas seen    Clue Cells, Wet Prep Clue cells seen (!) No Clue cells seen   Chlamydia  trachomatis & Neisseria gonorrhoeae, Amplified Detection   Result Value Ref Range    Chlamydia trachomatis, Amplified Detection Negative Negative    Neisseria gonorrhoeae, Amplified Detection Negative Negative   HIV Antigen/Antibody Screening [facility]   Result Value Ref Range    HIV Antigen / Antibody Negative Negative   Syphilis Screen, Cascade   Result Value Ref Range    Syphilis Screen [facility] Non-Reactive Non-Reactive   Hepatitis B Surface Antigen (HBsAG)   Result Value Ref Range    Hepatitis B Surface Ag Negative Negative   Herpes simplex Virus (Type 1 and 2) IgG Antibody   Result Value Ref Range    Herpes simplex Virus (Type 1) IgG Antibody Positive (!) Negative    Herpes simplex Virus (Type 2) IgG Antibody Equivocal, suggest repeat (!) Negative         BV (bacterial vaginosis)  -     metroNIDAZOLE (FLAGYL) 500 MG tablet; Take 1 tablet (500 mg total) by mouth 2 (two) times a day for 7 days.    Vaginal discharge  -     Wet Prep, Vaginal  -     Chlamydia trachomatis & Neisseria gonorrhoeae, Amplified Detection    Screen for STD (sexually transmitted disease)  -     HIV Antigen/Antibody Screening [place]  -     Syphilis Screen, Cascade  -     Hepatitis B Surface Antigen (HBsAG)  -     Herpes simplex Virus (Type 1 and 2) IgG Antibody    Lesion of labia  -     valACYclovir (VALTREX) 1000 MG tablet; Take 2 tablets (2,000 mg total) by mouth 2 (two) times a day for 1 day.

## 2021-06-14 NOTE — PROGRESS NOTES
ASSESSMENT ad plan       1. Screening for venereal disease   Solitary partner no barrier protection scheduled for MTP tomorrow.  Discharge noted. Chlamydia trachomatis & Neisseria gonorrhoeae, Amplified Detection   2. Sickle-cell trait (H) last hemoglobin from October 7 .7 patient also feels tired also has cold intolerance no weakness dizziness chest pain headaches noted.  Hemoglobin to be checked today    3. Urinary frequency noted over the last week is frequent past history of BV we will check a wet prep urinalysis to be evaluated today. Wet Prep, Vaginal    Urinalysis-UC if Indicated     4.  Bacterial vaginosis    Confirmed by wet prep test, Flagyl started.  Patient informed via phone call      Orders Placed This Encounter   Procedures     Chlamydia trachomatis & Neisseria gonorrhoeae, Amplified Detection     Order Specific Question:   Specimen Source?     Answer:   Urine     There are no discontinued medications.    No follow-ups on file.    CHIEF COMPLAINT:  Chief Complaint   Patient presents with     STD testing       HISTORY OF PRESENT ILLNESS:  Kitty is a 30 y.o. female comes the clinic for the first time, she usually goes to [facility].  She has a history of sickle cell trait.  She is noted increased urinary frequency with irritation over the last 10 days.  She remarks that she has prior history of frequent bacterial vaginosis.  She is been going to the bathroom more frequently has not noticed any blood but has noticed a thick discharge.  Denies any fever or chills.  Scheduled for an MTP tomorrow.  She has 1 solitary partner does not use any barrier protection.  She notes that she always feels cold no weakness chills chest pain.  No abdominal pain.  Appetites normal.    REVIEW OF SYSTEMS:    positive for irritation in the vaginal area.  Positive for increased urinary frequency.  10 point review of  All other systems are negative.    PFSH:  Social history reviewed    TOBACCO USE:  Social History      Tobacco Use   Smoking Status Never Smoker   Smokeless Tobacco Never Used   Tobacco Comment    no exposure       VITALS:  There were no vitals filed for this visit.  Wt Readings from Last 3 Encounters:   10/06/20 191 lb 6 oz (86.8 kg)   09/11/20 185 lb (83.9 kg)   08/17/20 190 lb (86.2 kg)       PHYSICAL EXAM:  Interactive female sitting [place] exam no acute distress  GI abdomen soft there is no focal tenderness bowel sounds are present  Gu  Discharge noted on the external genitalia.  No cervical motion tenderness.  External genitalia normal in appearance.  Exam done in the presence of female chaperone.    DATA REVIEWED:  Additional History from Old Records Summarized (2): Reviewed notes from last hospitalization for sickle cell trait transfusion given  Decision to Obtain Records (1): 0  Radiology Tests Summarized or Ordered (1): 0  Labs Reviewed or Ordered (1): 1  Medicine Test Summarized or Ordered (1): 0  Independent Review of EKG or X-RAY(2 each): 0    The visit lasted a total of 30 minutes face to face with the patient.  This included 10 minutes reviewing her chart medical history and prior hospitalization 5 minutes for documentation  And 15 minutes with the patient.    MEDICATIONS:  Current Outpatient Medications   Medication Sig Dispense Refill     cephalexin (KEFLEX) 500 MG capsule Take 500 mg by mouth 3 (three) times a day. X 10 days, start 9/28/20       No current facility-administered medications for this visit.      Vicente LOPEZ

## 2021-06-15 NOTE — PROGRESS NOTES
Patient due for colp/pap  Reminder call done today.   Pt notified -- states she will call to schedule appt

## 2021-06-15 NOTE — PROGRESS NOTES
Subjective:  27 y.o. female with concerns creasing frequency of herpes outbreaks.  Usually accompanied with menstrual cycle.  Has been every cycle for the last 3-4 months.  She is to treat this with valacyclovir intermittently.  She thinks she may want to start on chronic suppressive therapy at this time.    She has concerns about odor and discharge would like STD checks.  She thinks bacterial vaginosis is recurrent.  She is just finishing her menstrual cycle now.  History significant for bacterial vaginosis, yeast vaginitis, and trichomonas vaginitis.    Outpatient Medications Prior to Visit   Medication Sig Dispense Refill     HYDROcodone-acetaminophen 5-325 mg per tablet   0     ibuprofen (ADVIL,MOTRIN) 600 MG tablet TK 1 T PO Q 6 - 8 HOURS PRN FOR PAIN  0     metroNIDAZOLE (METROGEL VAGINAL) 0.75 % vaginal gel Insert into the vagina 2 (two) times a week. 140 g 3     No facility-administered medications prior to visit.       History   Smoking Status     Never Smoker   Smokeless Tobacco     Never Used     Comment: no exposure      Objective:  /50 (Patient Site: Right Arm, Patient Position: Sitting, Cuff Size: Adult Regular)  Pulse 88  Wt 174 lb (78.9 kg)  LMP 01/25/2018  Breastfeeding? No  BMI 29.41 kg/m2  GENERAL: alert, not distressed  CHEST: clear, no rales, rhonchi, or wheezes  CARDIAC: regular without murmur  ABDOMEN: soft, non tender, non distended, normal bowel sounds  : no herpetic lesions, no discharge, scant blood from cervix    Recent Results (from the past 240 hour(s))   Wet Prep, Vaginal   Result Value Ref Range    Yeast Result No yeast seen No yeast seen    Trichomonas No Trichomonas seen No Trichomonas seen    Clue Cells, Wet Prep No Clue cells seen No Clue cells seen   Chlamydia trachomatis & Neisseria gonorrhoeae, Amplified Detection   Result Value Ref Range    Chlamydia trachomatis, Amplified Detection Negative Negative    Neisseria gonorrhoeae, Amplified Detection Negative Negative       Assessment and Plan:   1. Genital herpes  Chronic suppression  - valACYclovir (VALTREX) 500 MG tablet; Take 1 tablet (500 mg total) by mouth daily.  Dispense: 90 tablet; Refill: 2    2. Screen for STD (sexually transmitted disease)  Patient expressed desire for blood tests, but then left without visiting phlebotomist.  - HIV Antigen/Antibody Screening [place]  - Syphilis Screen, Cascade  - Hepatitis C Antibody (Anti-HCV)  - Gynecologic Cytology (PAP Smear)     Contraception discussed. She declined.  Recommended PNVs.

## 2021-06-16 PROBLEM — Z34.93 ENCOUNTER FOR SUPERVISION OF NORMAL PREGNANCY IN THIRD TRIMESTER: Status: ACTIVE | Noted: 2019-01-14

## 2021-06-16 PROBLEM — D64.9 ANEMIA REQUIRING TRANSFUSIONS: Status: ACTIVE | Noted: 2020-10-05

## 2021-06-16 PROBLEM — D64.9 SYMPTOMATIC ANEMIA: Status: ACTIVE | Noted: 2020-10-05

## 2021-06-16 PROBLEM — R06.09 DOE (DYSPNEA ON EXERTION): Status: ACTIVE | Noted: 2020-10-05

## 2021-06-16 PROBLEM — O99.013 ANEMIA DURING PREGNANCY IN THIRD TRIMESTER: Status: ACTIVE | Noted: 2019-01-31

## 2021-06-16 PROBLEM — Z3A.39 39 WEEKS GESTATION OF PREGNANCY: Status: ACTIVE | Noted: 2019-03-19

## 2021-06-16 PROBLEM — Z84.89 FAMILY HISTORY OF GENETIC DISORDER: Status: ACTIVE | Noted: 2018-07-25

## 2021-06-16 NOTE — PROGRESS NOTES
FYI to provider - Patient is lost to pap tracking follow-up. Attempts to contact pt have been made per reminder process and there has been no reply and/or no appt scheduled.       8/17/20 LSIL, +HR HPV (not 16/18). Plan: colposcopy, due before 11/17/20 8/31/20 pt notified   10/16/20 MyChart reminder sent.  10/27/20 MyChart not read, letter sent.  11/17/20 [place] not done, chart and tracking updated for 6mo colp/pap, due 2/17/21.  2/3/21 Reminder mychart -- not read  3/3/21 Reminder call -- Pt notified   3/31/21 Lost to follow-up for pap tracking     Stefanie Oquendo RN BSN, Pap Tracking

## 2021-06-16 NOTE — PROGRESS NOTES
"Assessment:   1. Vaginal discharge  Wet prep was positive for clue cells.  Recommend starting metronidazole twice daily for 7 days.  Patient will follow-up with PCP if symptoms persist or worsens.  - Wet Prep, Vaginal    2. IUD check up  Discussed findings with patient and recommend transvaginal ultrasound to locate IUD.  - US Pelvis With Transvaginal Non OB; Future     Plan:      Symptomatic local care discussed.  Oral antibiotics see orders.  Discussed safe sex.  Follow up with PCP in 10 days.     Subjective:   Kitty Bueno is a 31 y.o. female who presents for evaluation of an abnormal vaginal discharge. Symptoms have been present for 4 days. Vaginal symptoms: discharge described as pink and odor. Contraception: IUD. She denies abnormal bleeding, blisters, bumps, burning, dyspareunia, lesions, pain, post coital bleeding, tears, urinary symptoms of none, vulvar itching and warts Sexually transmitted infection risk: very low risk of STD exposure. Menstrual flow: regular every 28-30 days.    The following portions of the patient's history were reviewed and updated as appropriate: allergies, current medications and problem list    Review of Systems  General: Denies general constitutional problems  Eyes: Denies problems  Ears/Nose/Throat: Denies problems  Cardiovascular: Denies problems  Respiratory: Denies problems  Gastrointestinal: Denies problems  Genitourinary: Denies problems  Musculoskeletal: Denies problems  Skin: Denies problems  Neurologic: Denies problems  Psychiatric: Denies problems  Endocrine: Denies problems  Heme/Lymphatic: Denies problems  Allergic/Immunologic: Denies problems      Objective:         Vitals:    04/22/21 0844   BP: 102/56   Pulse: 62   SpO2: 98%   Weight: 201 lb 1.6 oz (91.2 kg)   Height: 5' 4\" (1.626 m)     Physical Exam:  General Appearance: Alert, cooperative, no distress, appears stated age  Head: Normocephalic, without obvious abnormality, atraumatic  Pelvic:Normally developed " genitalia with no external lesions or eruptions. Vagina and cervix show no lesions, inflammation, discharge or tenderness. No cystocele, No rectocele. Uterus normal, IUD was not located.  No adnexal mass or tenderness.  Extremities: Extremities normal, atraumatic, no cyanosis or edema  Skin: Skin color, texture, turgor normal, no rashes or lesions  Neurologic: Normal

## 2021-06-17 NOTE — TELEPHONE ENCOUNTER
Had ultrasound done at [facility]. I read to her the ultrasound impression. She has no questions.  Caitie Chowdhury RN  [facility]    Reason for Disposition    Information only question and nurse able to answer    Additional Information    Negative: Nursing judgment    Negative: Nursing judgment    Negative: Nursing judgment    Negative: Nursing judgment    Protocols used: INFORMATION ONLY CALL - NO TRIAGE-A-OH

## 2021-06-18 NOTE — LETTER
Letter by Stephan Dotson MD at      Author: Stephan Dotson MD Service: -- Author Type: --    Filed:  Encounter Date: 1/31/2019 Status: (Other)       Kitty Bueno  [address] [place] [id number]             January 31, 2019         Dear Ms. Buneo,    Below are the results from your recent visit:    Resulted Orders   Glucose,Gestational Challenge (1 Hour)   Result Value Ref Range    Glucose, Gestational Challenge 1hr 87 70 - 139 mg/dL    Patient Fasting > 8hrs? No    Hemoglobin   Result Value Ref Range    Hemoglobin 9.7 (L) 12.0 - 16.0 g/dL       Diabetes test normal.   Hemoglobin is low.   Take PNV every day.  Also, add iron supplement twice per day.      Please call with questions or contact us using [employer].    Sincerely,        Electronically signed by Stephan Dotson MD

## 2021-06-18 NOTE — LETTER
Letter by Poly Coe PA-C at      Author: Poly Coe PA-C Service: -- Author Type: --    Filed:  Encounter Date: 1/14/2019 Status: (Other)         01/14/19    To Whom It May Concern:    Kitty Bueno was seen today at [facility] for Pregnancy Confirmation.    She is 30w1d.    Due Date: Estimated Date of Delivery: 3/24/19     Thank you.    Poly Coe PA-C

## 2021-06-19 NOTE — LETTER
Letter by Stephan Dotson MD at      Author: Stephan Dotson MD Service: -- Author Type: --    Filed:  Encounter Date: 11/18/2019 Status: Signed         Kitty Bueno  [address] [place] [address]             November 20, 2019         Dear Ms. Bueno,    Below are the results from your recent visit:    Resulted Orders   Gynecologic Cytology (PAP Smear)   Result Value Ref Range    Case Report       Gynecologic Cytology Report                       Case: [id number]                                   Authorizing Provider:  Stephan Dotson MD        Collected:           07/22/2019 1604              Ordering Location:     [facility]  Received:            07/22/2019 1604                                     Medicine/OB                                                                  First Screen:          [facility] CT                                                                               (ASCP)                                                                       Pathologist:           Harriet Cardenas MD                                                        Specimen:    SUREPATH PAP, SCREENING, Endocervical/cervical                                             Interpretation (!) Negative for squamous intraepithelial lesion or malignancy.      Atypical squamous cells of undetermined significance    Result Flag Abnormal (!) Normal    Specimen Adequacy       Satisfactory for evaluation, endocervical/transformation zone component present    HPV Reflex? Yes if Abnormal     HIGH RISK No     LMP/Menopause Date 7/1/19     Abnormal Bleeding No     Pt Status n/a     Birth Control/Hormones None     Previous Normal/Date 2/16/16     Prev Abn Date/Dx 1/31/18: ascus +HPV     Cervical Appearance normal    HPV High Risk DNA Cervical   Result Value Ref Range    HPV Source SurePath     HPV16 DNA Negative NEG    HPV18 DNA Negative NEG    Other HR HPV Positive (!) NEG    Final Diagnosis SEE NOTES        "Comment:      This patient's sample is positive for other HR HPV DNA (types 31, 33, 35, 39, 45,  51, 52, 56, 58, 59, 66 or 68), not HPV 16 or HPV 18 DNA. This result requires  clinical correlation with concurrent cytology findings.  This test was developed and its performance characteristics determined by the  [facility]. It  has not been cleared or approved by the FDA. The laboratory is regulated under  CLIA as qualified to perform high-complexity testing. This test is used for  clinical purposes. It should not be regarded as investigational or for  research.  (Note)  METHODOLOGY:  The Roche betty 4800 system uses automated extraction,  simultaneous amplification of HPV (L1 region) and beta-globin,  followed by  real time detection of fluorescent labeled HPV and beta  globin using specific oligonucleotide probes . The test specifically  identifies types HPV 16 DNA and HPV 18 DNA while concurrently  detecting the rest of the high risk   types (31, 33, 35, 39, 45, 51,  52, 56, 58, 59, 66 or 68).    COMMENTS:  This test is not intended for use as a screening device  for women under age 30 with normal cervical cytology.  Results should  be correlated with cytologic and histologic findings. Close clinical  followup is recommended.        Specimen Description Cervical Cells       Comment:      Performed and/or entered by:  [address] [address]         Pap results from a long time ago, I came back to review them now.   Mildly abnormal pap with some HPV that is not \"numbered\" but still considered higher risk HPV.   Should have colposcopy with obgyn (like Dr. Berger)   We can help schedule.       Please call with questions or contact us using [employer].    Sincerely,        Electronically signed by Stephan Dotson MD       "

## 2021-06-19 NOTE — LETTER
Letter by Stephan Dotson MD at      Author: Stephan Dotson MD Service: -- Author Type: --    Filed:  Encounter Date: 10/25/2019 Status: Signed         October 25, 2019     Patient: Kitty Bueno   YOB: 1990   Date of Visit: 10/25/2019       To Whom it May Concern:    Kitty Bueno was in my clinic on 10/25/2019 to make a appointment for her daughter Moises.  If you have any questions or concerns, please don't hesitate to call.    Sincerely,         Electronically signed by Stephan Dotson MD

## 2021-06-20 NOTE — LETTER
Letter by Stephan Dotson MD at      Author: Stephan Dotson MD Service: -- Author Type: --    Filed:  Encounter Date: 6/25/2020 Status: (Other)         Kitty Bueno  [address] [place] [address]             June 25, 2020         Dear Ms. Bueno,    Below are the results from your recent visit:    Resulted Orders   Wet Prep, Vaginal   Result Value Ref Range    Yeast Result No yeast seen No yeast seen    Trichomonas No Trichomonas seen No Trichomonas seen    Clue Cells, Wet Prep Clue cells seen (!) No Clue cells seen       The clinic attempted to reach you regarding the results of the tests above. Your phone is not in service.     You have bacterial vaginosis, an infection in the vagina. The doctor has prescribed a medicine to treat the infection, Metronidazole.      your medication at the pharmacy, take 1 tablet twice daily for 7 days. Do NOT drink alcohol while taking this medication.     If you have questions for the doctor, call  [phone].     Please call with questions or contact us using [employer].    Sincerely,        Electronically signed by Suleiman Ramirez MD

## 2021-06-20 NOTE — LETTER
Letter by Suleiman Ramirez MD at      Author: Suleiman Ramirez MD Service: -- Author Type: --    Filed:  Encounter Date: 6/26/2020 Status: (Other)         Kitty Bueno  [address] [place] [address]             June 26, 2020         Dear Ms. Bueno,    Below are the results from your recent visit:        Your chlamydia and gonorrhea tests are  normal.      Please call with questions or contact us using [employer].    Sincerely,        Electronically signed by Suleiman Ramirez MD

## 2021-06-21 NOTE — LETTER
Letter by Sangeeta Carpenter MA at      Author: Sangeeta Carpenter MA Service: -- Author Type: --    Filed:  Encounter Date: 10/16/2020 Status: (Other)         Kitty Bueno  [address] [place] [address]             October 27, 2020         Dear Ms. Bueno,    At [facility], your health and wellness are our primary concern. That is why we are following up on your most recent abnormal Pap smear and positive high-risk Human Papillomavirus (HPV) test.     Please call [phone] to schedule an appointment for your recommended follow-up Colposcopy (this cannot be scheduled through [facility]) at your earliest convenience.      If you have completed the appointment outside of the [facility] system, please have the records forwarded to our office. We will update your chart for your provider to review before your next annual wellness visit.      Thank you for choosing [facility]!        Sincerely,    Your [facility] Care Team

## 2021-06-22 NOTE — PROGRESS NOTES
Order to d/c pt home. Pt to follow up as scheduled in clinic. Pt states understanding of d/c instructions and follow up care

## 2021-06-22 NOTE — PROGRESS NOTES
US WNL. FHT's remain category 1. Order for continuous monitoring x4 hrs since arrival per abdominal trauma protocol. Pt states understanding of plan. Pt is comfortable and states she does not feel abdominal cramping anymore.
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
## 2021-06-23 NOTE — PATIENT INSTRUCTIONS - HE
Pregnancy: 30 weeks    Due Date: March 24, 2019    Next visit in 2 weeks  With Dr. Dotson  For Your First OB Visit

## 2021-06-23 NOTE — TELEPHONE ENCOUNTER
New Appointment Needed  What is the reason for the visit:  First ob  First OB Appt Request- reschedule 12/31 no show appointment   Provider Preference: Dr. Dotson  How soon do you need to be seen?: next available  Waitlist offered?: No  Okay to leave a detailed message:  Yes

## 2021-06-23 NOTE — PATIENT INSTRUCTIONS - HE
Patient Education   HEALTHY PREGNANCY CARE: 30-34 WEEKS PREGNANT    You have made it to the final months of your pregnancy. By now, your baby is starting to fill out with some fat under his skin, fuzzy hair on his shoulders, and is gaining 4 to 6 ounces per week.    Discuss any travel plans with your midwife or physician.    Review possible changes in sexuality during later pregnancy and discuss these with your midwife or physician, as well as your partner. Alternative love-making positions may be more comfortable.    Discuss labor and delivery issues with your midwife or physician. If you had a  birth in the past, discuss a trial of labor with your midwife or physician. He or she may ask that you sign a consent form, if you wish to have a vaginal birth after  (). Ask your midwife or physician to explain your options for managing pain during your labor and delivery. Sometimes, during the birth process, an episiotomy may need to be cut in the vagina to make the opening bigger or let the baby come out quicker. You may want to discuss the episiotomy and how often it is needed with your midwife or physician.    Plan for your baby's care by selecting a child health care provider (Family physician, Pediatrician, or Pediatric Nurse Practitioner). Practice installing an infant car seat correctly in the car. Ask for car seat information as needed and make sure it is safe and will work in the car your baby will ride in. You will need a car seat to bring your baby home from the hospital. Check the procedure for adding your baby to your health care plan. Review your decision about circumcision and ask for any information you need. As you buy and receive items for your baby, don't put a baby walker on your list. Walkers can be dangerous and can cause serious injury to your child. A safer option is a saucer-type play station, since it doesn't allow baby to travel across the floor.    Discuss your choices and  plans for birth control with your midwife or physician. Women who are breastfeeding can still become pregnant. Use a birth control method if you want to lower your pregnancy risk. Talk to your midwife or physician if you are considering permanent birth control, such as tubal ligation or Essure. You may need to complete a consent form 30 days prior to delivery in order to have this done after you deliver.    Continue to watch for signs and symptoms of preeclampsia:     Sudden swelling of your face, hands, or feet     New vision problems such as blurring, double vision, or flashing lights    A severe headache not relieved with acetaminophen (Tylenol)    Sharp or stabbing pain in your right or middle upper abdomen    Watch for signs and symptoms of premature labor:     Regular contractions. This means having about 6 or more within 1 hour, even after you have had a glass of water and are resting.     A backache that starts and stops regularly.    An increase or change in vaginal discharge, such as heavy, mucus-like, watery, or bloody discharge.     Your water breaks or leaks.    If you have any of the above symptoms or any other concerns, call your provider or their clinic staff at [facility]/OB at Phone: [phone]. If it's after clinic hours, physician patients should call the [facility] at [phone]); midwife patients should call their answering service at [phone].    How can you care for yourself at home?   You can refer to the Starting Out Right book or find it online at http://www.[email or web address]/images/stories/maternity/[email or web address]-[email or web address]-Right.pdf or http://www.[email or web address]/images/stories/flipbooks/[facility]-starting-out-right/[facility]-starting-out-right.html#p=8     You can sign up for a weekly parenting e-mail that gives support, tips and advice from health care professionals that starts with pregnancy and continues through the toddler years. To register,  go to [email or web address] at any time during your pregnancy.

## 2021-06-23 NOTE — PROGRESS NOTES
Chief Complaint:  Chief Complaint   Patient presents with     [facility]     Routine OB follow up - 30w1d     HPI:   Kitty Bueno is a 28 y.o. female is here for pregnancy intake.  She is .  Patient's last menstrual period was 2018.  History of  x 3.  Was seeing [facility], transferring here.  Reports no concerns regarding pregnancy.  Got anatomic survey ultrasound, normal.  All records requested and reviewed through care everywhere, chart will be updated in EHR and OB module.    Past medical history: reviewed and updated.  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 last        Current Outpatient Medications:      acyclovir (ZOVIRAX) 400 MG tablet, Take 400 mg by mouth., Disp: , Rfl:      prenatal no115-iron-folic acid 29 mg iron- 1 mg Chew, Chew daily., Disp: , Rfl:      metroNIDAZOLE (FLAGYL) 500 MG tablet, TK 1 T PO BID FOR 14 DAYS, Disp: , Rfl: 0    Social:  Social History     Tobacco Use     Smoking status: Never Smoker     Smokeless tobacco: Never Used     Tobacco comment: no exposure   Substance Use Topics     Alcohol use: No     Frequency: Never     Comment: rarely     Drug use: No       OBJECTIVE:  No Known Allergies  Vitals:    19 1134   BP: 98/46   Pulse: 92   Resp: 20   Temp: 97.8  F (36.6  C)     Body mass index is 33.32 kg/m .    Vital signs stable as recorded above  General: Patient is alert and oriented x 3, in no apparent distress  Fetal Exam: Fundal height was palpable at 27 cm, fetal heart tones are heard at 150 bpm.    Results:  Results for orders placed or performed in visit on  19   Pregnancy, Urine   Result Value Ref Range    Pregnancy Test, Urine Positive (!) Negative   Urinalysis, OB Screen   Result Value Ref Range    Glucose, UA Negative Negative    Ketones, UA Negative Negative    Protein, UA Negative Negative mg/dL     Other screening pregnancy lab work is ordered and pending.    Assessment and Plan:  1. Pregnancy Intake Appointment.  Kitty Bueno is 28 y.o. and .  Patient's last menstrual period was 2018.  Estimated Date of Delivery: 3/24/19  She will be seeing Dr. Dotson for OB care.  Screening pregnancy lab work was drawn at previous clinic, reviewed today.  Prenatal vitamins already prescribed.  Problem list and current medications reviewed and updated.  Anatomic survey ultrasound already completed.  Prenatal info packet given.  She declined flu shot, may get it at next visit.  Last office visit at [facility] was on 2018.   Office note from 18 notes: Reviewed genetic testing, everything normal.    2.  Sickle cell trait.  Baby's father has been tested and does not have sickle cell trait.    3.  Genital herpes.  Patient has acyclovir to use as needed.    4. Child born in  with anophthalmia.    Follow up in 2 weeks.  Please see OB Episode for complete details.  Visit was approximately 30 minutes, greater than 50% of time spent in face-to-face counseling and coordination of care.    This dictation uses voice recognition software, which may contain typographical errors.

## 2021-06-23 NOTE — PROGRESS NOTES
"Subjective:  28 y.o. female with concerns of prengnacy follow up.  Wants to deliver at [place], so transferring care here.  Will need to see ob to set up repeat cs.  Thinks PPTL as well.  Forms signed today.  She might have signed forms at previous clinic.    No ctx, lof, bleeding, or dc.  No HA or vision changes.     Known SS trait.  Knows FOB does not have SS trait.    BV concerns.  Happens often.   Might be due to having contact with FOB again.  Thinks treated 2 times this pregnancy already.    Herpes hx with no current lesions.  Discussed suppressive therapy in later pregnancy.  She is nearly doing this already with the supply of acyclovir that she has.    Old records reviwed:  \"Reviewed genetic testing, everything normal.\" per KENZIE Shetty, CNM, IBCLC 11/1/2018    FOB upset it is a girl.    Outpatient Medications Prior to Visit   Medication Sig Dispense Refill     prenatal no115-iron-folic acid 29 mg iron- 1 mg Chew Chew daily.       acyclovir (ZOVIRAX) 400 MG tablet Take 400 mg by mouth.       metroNIDAZOLE (FLAGYL) 500 MG tablet Take 1 pill twice a day for 7 days. 14 tablet 0     No facility-administered medications prior to visit.       Social History     Tobacco Use   Smoking Status Never Smoker   Smokeless Tobacco Never Used   Tobacco Comment    no exposure      Objective:  BP 96/44 (Patient Site: Left Arm, Patient Position: Sitting, Cuff Size: Adult Large)   Pulse 88   Wt 199 lb 1.3 oz (90.3 kg)   LMP 06/17/2018 (Approximate)   BMI 33.13 kg/m    GENERAL: alert, not distressed  CHEST: clear, no rales, rhonchi, or wheezes  CARDIAC: regular without murmur, gallop, or rub  ABDOMEN: gravid soft, non tender, non distended, normal bowel sounds     See prenatal chart    Recent Results (from the past 240 hour(s))   Syphilis Screen, [place]    Collection Time: 01/29/19  1:07 PM   Result Value Ref Range    Treponema Antibody (Syphilis) Negative Negative   Glucose,Gestational Challenge (1 Hour)    " Collection Time: 19  1:07 PM   Result Value Ref Range    Glucose, Gestational Challenge 1hr 87 70 - 139 mg/dL    Patient Fasting > 8hrs? No    Hemoglobin    Collection Time: 19  1:07 PM   Result Value Ref Range    Hemoglobin 9.7 (L) 12.0 - 16.0 g/dL       Assessment and Plan:   1. Bacterial vaginitis  Repeat  flaguul  - metroNIDAZOLE (FLAGYL) 500 MG tablet; Take 1 pill twice a day for 7 days.  Dispense: 14 tablet; Refill: 0    2. Genital herpes simplex, unspecified site  supression at 36 weeks:  - acyclovir (ZOVIRAX) 400 MG tablet; Take 1 tablet (400 mg total) by mouth 3 (three) times a day.  Dispense: 90 tablet; Refill: 1    3. Gastroesophageal reflux disease, esophagitis presence not specified  - ranitidine (ZANTAC) 150 MG tablet; Take 1 tablet (150 mg total) by mouth at bedtime.  Dispense: 30 tablet; Refill: 1  - calcium, as carbonate, (TUMS) 200 mg calcium (500 mg) chewable tablet; Chew 1 tablet (200 mg total) daily.  Dispense: 90 tablet; Refill: 3    4. Previous  delivery affecting pregnancy  See OB regarding plan for delivery.  - Glucose,Gestational Challenge (1 Hour)  - Hemoglobin  - Ambulatory referral to Obstetrics / Gynecology  - Influenza, Seasonal,Quad Inj, 36+ MOS  - Tdap vaccine,  6yo or older,  IM     5. Anemia  PNVs and iron supplement.

## 2021-06-23 NOTE — TELEPHONE ENCOUNTER
Question following Office Visit  When did you see your provider: 1/14/19  What is your question: I was told the  Metronidazole medication would be refilled in this visit but the my pharmacy never received it. Please sent ASAP  Okay to leave a detailed message: Yes

## 2021-06-24 NOTE — TELEPHONE ENCOUNTER
Called [facility]. Brionna is not sure what the pt is telling us. She will call to speak with the patient and call us back with more information.

## 2021-06-24 NOTE — PROGRESS NOTES
ASSESSMENT/PLAN:  1. Supervision of normal pregnancy in third trimester  Group B Strep Screen by PCR    Urinalysis, OB Screen       This is a 30 yo female, , here for prenatal visit at 37w3d.  Scheduled for  on 3/19/19 with Dr. Adeel Berger.  She had emergency  with 1st delivery; has had repeat C-sections since then.  She reports vague, occasional contractions.  Cervix is closed.  Group B strep screen collected and sent today.  Reviewed signs/sx which should prompt urgent follow up.  UA negative today.    Return in about 1 week (around 3/13/2019) for Prenatal visit.      There are no discontinued medications.  There are no Patient Instructions on file for this visit.    Chief Complaint:  Chief Complaint   Patient presents with     Routine Prenatal Visit     no concerns       HPI:   Kitty Bueno is a 29 y.o. female c/o  Here for routine prenatal visit   Needs GBS screen -   Previous deliveries:    8 yo son - emergency ; (son has learning disorders)  7 yo judith - repeat C- section; (she has sickle anemia - doing well recently)  3 yo judith - repeat ; (she has anophthalmia; neurologic deficits still being evaluated)    C section scheduled 3/19/19 - Dr. Tino Berger  Having contractions - irregular  No cervical check previously    EDC 3/24, 37 w 3 d      PMH:   Patient Active Problem List    Diagnosis Date Noted     Anemia during pregnancy in third trimester 2019     Encounter for supervision of normal pregnancy in third trimester 2019     Family history of genetic disorder 2018     Previous  delivery affecting pregnancy 2015     Genital herpes 2015     Anemia 2015     Anxiety 2015     Abnormal Pap 2014     Nondependent cannabis abuse 2010     Sickle-cell trait (H) 2010     Past Medical History:   Diagnosis Date     Asthma      Chlamydia      History of transfusion 2014    When her IUD came out     Pregnancy  complicated by female genital mutilation 2015     Urinary tract infection      Past Surgical History:   Procedure Laterality Date      SECTION      baby's heart rate dropped      SECTION  2010    repeat      SECTION, LOW TRANSVERSE      3 cesareans. last      Social History     Socioeconomic History     Marital status: Single     Spouse name: Not on file     Number of children: Not on file     Years of education: Not on file     Highest education level: Not on file   Occupational History     Occupation: janitorial - cleans offices (part time)   Social Needs     Financial resource strain: Not on file     Food insecurity:     Worry: Not on file     Inability: Not on file     Transportation needs:     Medical: Not on file     Non-medical: Not on file   Tobacco Use     Smoking status: Never Smoker     Smokeless tobacco: Never Used     Tobacco comment: no exposure   Substance and Sexual Activity     Alcohol use: No     Frequency: Never     Comment: rarely     Drug use: No     Sexual activity: Yes     Partners: Male     Birth control/protection: None   Lifestyle     Physical activity:     Days per week: Not on file     Minutes per session: Not on file     Stress: Not on file   Relationships     Social connections:     Talks on phone: Not on file     Gets together: Not on file     Attends Restorationism service: Not on file     Active member of club or organization: Not on file     Attends meetings of clubs or organizations: Not on file     Relationship status: Not on file     Intimate partner violence:     Fear of current or ex partner: Not on file     Emotionally abused: Not on file     Physically abused: Not on file     Forced sexual activity: Not on file   Other Topics Concern     Not on file   Social History Narrative    Lives with baby and older 2 children     Family History   Problem Relation Age of Onset     Sickle cell anemia Daughter      Other Daughter         Anophthalmia     No  Medical Problems Mother      No Medical Problems Father      No Medical Problems Sister      No Medical Problems Brother      No Medical Problems Sister      No Medical Problems Sister        Meds:    Current Outpatient Medications:      acyclovir (ZOVIRAX) 400 MG tablet, Take 1 tablet (400 mg total) by mouth 3 (three) times a day., Disp: 90 tablet, Rfl: 1     calcium, as carbonate, (TUMS) 200 mg calcium (500 mg) chewable tablet, Chew 1 tablet (200 mg total) daily., Disp: 90 tablet, Rfl: 3     ferrous sulfate 325 (65 FE) MG tablet, Take 1 tablet (325 mg total) by mouth daily., Disp: 60 tablet, Rfl: 1     FLUoxetine (PROZAC) 20 MG capsule, Take 1 capsule (20 mg total) by mouth daily., Disp: 60 capsule, Rfl: 2     hydrOXYzine HCl (ATARAX) 25 MG tablet, Take 1 tablet (25 mg total) by mouth 3 (three) times a day as needed for itching., Disp: 90 tablet, Rfl: 0     prenatal no115-iron-folic acid 29 mg iron- 1 mg Chew, Chew daily., Disp: , Rfl:      ranitidine (ZANTAC) 150 MG tablet, Take 1 tablet (150 mg total) by mouth at bedtime., Disp: 30 tablet, Rfl: 1    Allergies:  No Known Allergies    ROS:  Pertinent positives as noted in HPI; otherwise 12 point ROS negative.      Physical Exam:  EXAM:  /60 (Patient Site: Right Arm, Patient Position: Sitting, Cuff Size: Adult Regular)   Pulse 84   Wt 206 lb (93.4 kg)   LMP 06/17/2018 (Approximate)   BMI 34.28 kg/m     Gen:  NAD, appears well, well-hydrated, mood okay  Abd:  Soft, gravid, nontender,   Fundal height 37 cm; fetus is active, vertex  Cervix:  Closed, presentation high , 25% effaced  Group B strep swab collected from vagina/perineum/rectum  Extr:  Neg., no edema  Neuro:  No asymmetry  Skin:  Warm/dry        Results:  Results for orders placed or performed in visit on 03/06/19   Urinalysis, OB Screen   Result Value Ref Range    Glucose, UA Negative Negative    Ketones, UA Negative Negative    Protein, UA Negative Negative mg/dL

## 2021-06-24 NOTE — TELEPHONE ENCOUNTER
SHABBIR    Contacted (Maty) she stated that she was not able to contact pt by phone, but pt will be coming in to their office either today or tomorrow and will keep us updated.
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
## 2021-06-24 NOTE — PATIENT INSTRUCTIONS - HE
Patient Education   HEALTHY PREGNANCY CARE: 34-36 WEEKS PREGNANT    Your baby is gaining about an ounce each day, so healthy nutrition and rest are still very important. Learn about late pregnancy symptoms, such as constipation and backaches. Drinking more fluids and eating more fiber can relieve constipation. The pelvic tilt and a heating pad can ease backaches.    Continue to watch for signs and symptoms of preeclampsia:     Sudden swelling of your face, hands, or feet     New vision problems such as blurring, double vision, or flashing lights    A severe headache not relieved with acetaminophen (Tylenol)    Sharp or stabbing pain in your right or middle upper abdomen    You're almost done with your pregnancy but it's still too soon to have your baby. The goal is to have your baby after 37 weeks, so watch for signs and symptoms of premature labor:     Regular contractions. This means having about 6 or more within 1 hour, even after you have had a glass of water and are resting.     A backache that starts and stops regularly.    An increase or change in vaginal discharge, such as heavy, mucus-like, watery, or bloody discharge.     Your water breaks or leaks.    You will be tested for group B streptococcus (GBS), a type of bacteria found in 10-30% of pregnant women. A woman with GBS can pass it to her baby during delivery. Most babies who get GBS from their mothers do not have any problems, but some babies get very ill and need to be hospitalized for antibiotic treatment. Treating you with antibiotics during labor and delivery helps to prevent infection in your baby.    Review information on postpartum care that you will need after the baby is born.  Discuss your choices and plans for birth control with your midwife or physician.     Postpartum Care  During the days and weeks after the delivery of your baby (postpartum period), your body will change as it returns to its nonpregnant condition. As with pregnancy  "changes, postpartum changes are different for every woman.    Physical changes after childbirth  The changes in your body may include:    Contractions called afterpains shrink the uterus for several days after childbirth. Shrinking of the uterus to its prepregnancy size may take 6 to 8 weeks.    Sore muscles (especially in the arms, neck, or jaw) are common after childbirth. This is because of the hard work of labor and pushing your baby out. The soreness should go away in a few days.    Bleeding and vaginal discharge (lochia) may last for 2 to 8 weeks, and can come and go for about 2 months.    Vaginal soreness, including pain, discomfort, and numbness, is common after vaginal birth. Soreness may be worse if you had a perineal tear or episiotomy.    If you had a  birth (), you may have pain in your lower abdomen and may need pain medicine for 1 to 2 weeks.    Breast engorgement is common around the 3rd or 4th day after delivery, when the breasts begin to fill with milk. This can cause discomfort and swelling. If you are breast feeding, the best treatment is to feed your baby often or pump the milk out. You can also use warm compresses. If you are not breast feeding, placing ice packs on your breasts, taking a hot shower, or using warm compresses may relieve the discomfort.    Be aware of postpartum depression    \"Baby blues\" are common for the first 1 to 2 weeks after birth. You may cry or feel sad or irritable for no reason.     For some women, these feelings last longer and are more intense. This is called postpartum depression.     If your symptoms last for more than a few weeks or you feel very depressed, ask your midwife or physician for help.     Postpartum depression can be treated. Support groups and counseling can help, but sometimes medication is needed.     Discuss follow-up appointments with your midwife or physician, as well. He or she will usually want to see you 6 weeks after the " birth of your baby, sooner if you are having problems.    If you have questions about any symptoms you are experiencing or any other concerns, call your provider or their clinic staff at [facility]/OB at Phone: [phone]. If it's after clinic hours, physician patients should call the [facility] at [phone] ([phone]); midwife patients should call their answering service at [phone].    How can you care for yourself at home?   You can refer to the Starting Out Right book or find it online at [email or web address]/[email or web address]/[email or web address]-[email or web address]-Right.pdf or [email or web address]/images/[email or web address]/flipbooks/[facility]-starting-out-right/[facility]-starting-out-right.html#p=8     You can sign up for a weekly parenting e-mail that gives support, tips and advice from health care professionals that starts with pregnancy and continues through the toddler years. To register, go to [email or web address] at any time during your pregnancy.        Making Plans for Feeding My Baby    By this point, you probably have read a lot about feeding your baby.  Breastfeed or formula? Each mother s decision is her own and [facility] respects you and your choices. We ve gathered information on both breastfeeding and formula feeding to help with your decision. Talking with your physician or nurse-midwife can also help in your decision.  However you plan to feed your baby, [facility] encourage rooming in with your baby, skin-to-skin contact and feeding your baby based on his or her cues.    Skin-to-skin contact  Being close to mom helps your baby adjust to life outside of the womb.  It helps your baby regulate their body temperature, heart rate, and breathing.  Your baby will usually be placed skin-to-skin immediately following birth or as soon as possible, if medical intervention is needed.    Rooming-In  Having your  baby stay with you in your room is called  rooming-in .  Keeping your baby in your room helps you to learn how to care for your baby by getting to know your baby s cues, body rhythms and sleep cycle.       Cue-based feeding  Cues (signals) are baby s way of telling you what he or she wants.  When you learn your infant s cues, you know how to care for and feed your baby.   Feeding cues are the licking and smacking of lips, bringing their fist to their mouth, and a reflex called  rooting - where baby turns and opens his or her mouth, searching for the breast or bottle.  Crying is a late feeding cue.  Babies can feed frequently, often at least 8 times in 24 hours.  Breastfeeding facts  Breast milk is the best source of nutrition for your baby and is available at birth. In the first couple of days, your milk volume is already starting to increase, though it may not be noticeable. Breastfeed frequently to increase your milk supply. Within three to five days, you will begin to notice larger milk volumes. An increase in breast size, heaviness and firmness are often described as the milk  coming in.  Frequent breastfeeding can help breasts from getting overly firm and painful. You will know the baby is getting enough milk if your baby is having wet and dirty diapers and gaining weight.     If your goal is to exclusively breastfeed, it is important to not use any formula or artificial nipples (including bottles and pacifiers) while your baby is learning to breastfeed.  While it may seem like an  easy  option to give your baby a bottle, formula should only be given if there is a medical reason for your baby to have it.    Positioning and attachment   Get comfortable.  Use pillows as needed to support your arms and baby.  Hold baby close at the level of your breast, facing you in a tummy to tummy position.  Skin to skin helps with this.  Position the baby with his or her nose by the nipple.  There should be a straight line  from baby s ear to shoulder to hips.  Tickle your baby s lips or wait for baby to open mouth wide, bring baby to breast by leading with the chin.  Aim the nipple at the roof of baby s mouth.  A rapid sucking pattern is followed by longer, drawing pattern with occasional swallows heard.  When baby is correctly latched, your nipple and much of the areola are pulled well into baby s mouth.      Returning to work or school  Focus on a good start to breastfeeding.  Many women continue to provide breastmilk for their baby when they return to work or school.  Making plans about where to pump and store milk can make the transition go well.  Talk with other mothers who have also returned to work or school for tips and support.  Your employer s Human Resource department may be a resource as well.     Returning to work or school: (continued)     babies can mean fewer  sick  days for you.    A quality breast pump will also save time and add comfort.  Check with your insurance prior to giving birth for breast pump coverage.  Many insurance companies include a pump within your benefits.    Wait until your baby is at least three weeks old to introduce a bottle for the first time.  Have someone besides you give the bottle.    Breastfeed when you are with your baby. Reserve your bottles of breast milk for when you are away.     Your breasts will need to be  emptied  either by your baby or a pump.  Plan to pump at least twice in an eight hour day.    If you cannot pump at work, continue breastfeeding at home. Any amount of breast milk is worth giving to your baby.    Formula feeding facts  If you are planning to use formula to feed your baby, you will want to make some preparations ahead of time. Talk to your doctor or nurse-midwife about what type of formula to use. Some are iron-fortified, meaning they have extra iron in them. You will want to purchase formula and bottles before your baby is born to be sure you are ready  after you return from the hospital. The [facility] do not provide formula samples to take home.    Be sure to follow formula mixing directions closely. Regular milk in the dairy case at the grocery store should not be given to babies under 1 year old. Baby formula is sold in several forms including:    Ready-to-use. This is the most expensive, but no mixing is necessary.    Concentrated liquid. This is less expensive than ready-to-use and you mix with water.    Powder. This is the least expensive. You mix one level scoop of powdered formula with two ounces of water and stir well.    Most babies need 2.5 ounces of formula per pound of body weight each day. This means an 8-pound baby may drink about 20 ounces of formula a day; however, this is just an estimate. The most important thing is to pay attention to your baby s cues.  If your baby is always fussy, needs more iron or has certain food allergies, your physician may suggest you change your baby s formula to a different kind.   How do I warm my baby s bottles?  You may feed your baby a bottle without warming it first. It is OK for the breast milk or formula to be cool or room temperature. If your baby seems to prefer it warmed, you can put the filled bottle in a container of warm water and let it stand for a few minutes. Check the temperature of the liquid on your skin before feeding it to your baby; to be sure it isn t too hot. Do not heat bottles in the microwave. Microwaves heat food and liquids unevenly, and this can cause hot spots that can burn your baby.    How do I clean and sterilize bottles?  Sterilize bottles and nipples before you use them for the first time. You can do this by putting them in boiling water for 5 minutes. After that first time, you can wash them in hot and soapy water. Rinse them carefully to be sure there is no soap left on them. You can also wash them in the .    [facility] [phone] ([phone])

## 2021-06-24 NOTE — TELEPHONE ENCOUNTER
----- Message from Stephan Dotson MD sent at 2/26/2019  5:27 PM CST -----  Please call:   [facility] ([place]) [phone]   Patient says we are supposed to authorize/sign something in regard to  or PCA as suggested by [facility] .

## 2021-06-25 NOTE — ANESTHESIA POSTPROCEDURE EVALUATION
Patient: Kitty Bueno  REPEAT  SECTION,WITH TUBAL LIGATION  Anesthesia type: spinal    Patient location: [facility]  Last vitals:   Vitals:    19 0400   BP: 107/44   Pulse: 75   Resp: 16   Temp: 36.7  C (98.1  F)   SpO2:      Post vital signs: stable  Level of consciousness: awake and responds to simple questions  Post-anesthesia pain: pain controlled  Post-anesthesia nausea and vomiting: no  Pulmonary: unassisted, return to baseline  Cardiovascular: stable and blood pressure at baseline  Hydration: adequate  Anesthetic events: no    QCDR Measures:  ASA# 11 - [facility]-op Cardiac Arrest: ASA11B - Patient did NOT experience unanticipated cardiac arrest  ASA# 12 - [facility]-op Mortality Rate: ASA12B - Patient did NOT die  ASA# 13 - PACU Re-Intubation Rate: ASA13B - Patient did NOT require a new airway mgmt  ASA# 10 - Composite Anes Safety: ASA10A - No serious adverse event    Additional Notes:

## 2021-06-25 NOTE — ANESTHESIA CARE TRANSFER NOTE
Last vitals:   Vitals:    03/19/19 1148   BP: 104/59   Pulse: 77   Resp: 16   Temp: 36.4  C (97.5  F)   SpO2: 100%     Patient's level of consciousness is awake  Spontaneous respirations: yes  Maintains airway independently: yes  Dentition unchanged: yes  Oropharynx: oropharynx clear of all foreign objects    QCDR Measures:  ASA# 20 - Surgical Safety Checklist: WHO surgical safety checklist completed prior to induction    PQRS# 430 - Adult PONV Prevention: NA - Not adult patient, not GA or 3 or more risk factors NOT present  ASA# 8 - Peds PONV Prevention: NA - Not pediatric patient, not GA or 2 or more risk factors NOT present  PQRS# 424 - [facility]-op Temp Management: 4559F - At least one body temp DOCUMENTED => 35.5C or 95.9F within required timeframe  PQRS# 426 - PACU Transfer Protocol: - Transfer of care checklist used  ASA# 14 - Acute Post-op Pain: ASA14B - Patient did NOT experience pain >= 7 out of 10

## 2021-06-25 NOTE — ANESTHESIA PROCEDURE NOTES
Peripheral Block    Patient location during procedure: OR  Start time: 3/19/2019 11:22 AM  End time: 3/19/2019 11:25 AM  post-op analgesia per surgeon order as noted in medical record  Staffing:  Performing  Anesthesiologist: Pro Banerjee MD  Preanesthetic Checklist  Completed: patient identified, site marked, risks, benefits, and alternatives discussed, timeout performed, consent obtained, airway assessed, oxygen available, suction available, emergency drugs available and hand hygiene performed  Peripheral Block  Block type: other, TAP  Prep: ChloraPrep  Patient position: supine  Patient monitoring: cardiac monitor, continuous pulse oximetry, heart rate and blood pressure  Laterality: bilateral, same technique used bilaterally  Injection technique: ultrasound guided            Needle  Needle type: Stimuplex   Needle gauge: 20G  Needle length: 6 in  no peripheral nerve catheter placed  Assessment  Injection assessment: no difficulty with injection, negative aspiration for heme, no paresthesia on injection and incremental injection

## 2021-06-25 NOTE — PROGRESS NOTES
The patient arrived to [facility] reporting vaginal bleeding that started one hour ago.  The patient reports that she has had to wipe approx 3 times in the last hour.  Each time she wipes there is a smear of red blood.  The patient has not needed to wear a pad.  The patient denies leaking fluid, contractions, or cramping.  The patient states baby is moving as usual.  The pt reports that she has had sex in the last 24 hours.  Dr. Dotson called and updated about patient status.  Orders received for ultrasound followed by YARI.

## 2021-06-25 NOTE — ANESTHESIA PROCEDURE NOTES
Spinal Block    Patient location during procedure: OR  Start time: 3/19/2019 10:08 AM  End time: 3/19/2019 10:13 AM  Reason for block: primary anesthetic    Staffing:  Performing  Anesthesiologist: Pro Banerjee MD    Preanesthetic Checklist  Completed: patient identified, risks, benefits, and alternatives discussed, timeout performed, consent obtained, airway assessed, oxygen available, suction available, emergency drugs available and hand hygiene performed  Spinal Block  Patient position: sitting  Prep: ChloraPrep  Patient monitoring: heart rate, cardiac monitor, continuous pulse ox and blood pressure  Approach: midline  Location: L3-4  Injection technique: single-shot  Needle type: pencil-tip   Needle gauge: 24 G    Assessment  Sensory level: T8

## 2021-06-25 NOTE — PROGRESS NOTES
The patient reports that the amount of bleeding when she wipes has decreased to a very small smear.

## 2021-06-25 NOTE — ANESTHESIA PREPROCEDURE EVALUATION
Anesthesia Evaluation      Patient summary reviewed   No history of anesthetic complications     Airway   Mallampati: II   Pulmonary - normal exam   (+) asthma  mild,  well controlled,                          Cardiovascular - negative ROS and normal exam  Exercise tolerance: > or = 10 METS  Rhythm: regular  Rate: normal,         Neuro/Psych - negative ROS     Endo/Other    (+) pregnant     GI/Hepatic/Renal - negative ROS      Other findings: Results for TERRI RODRIGUEZ (MRN [id number]) as of 3/19/2019 09:25    3/19/2019 08:33  Hemoglobin: 9.3 (L)  ABORh: B POS  Antibody Screen: Negative        Dental - normal exam                        Anesthesia Plan  Planned anesthetic: spinal  Spinal  Intrathecal Duramorph  TAP block discussed for POP - okay with surgeon    ASA 2     Anesthetic plan and risks discussed with: patient    Post-op plan: routine recovery

## 2021-06-26 ENCOUNTER — HEALTH MAINTENANCE LETTER (OUTPATIENT)
Age: 31
End: 2021-06-26
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
## 2021-07-02 ENCOUNTER — COMMUNICATION - HEALTHEAST (OUTPATIENT)
Dept: SCHEDULING | Facility: CLINIC | Age: 31
End: 2021-07-02

## 2021-07-02 DIAGNOSIS — B96.89 BACTERIAL VAGINOSIS: ICD-10-CM

## 2021-07-02 DIAGNOSIS — N76.0 BACTERIAL VAGINOSIS: ICD-10-CM

## 2021-07-03 NOTE — ADDENDUM NOTE
Addendum Note by Manfred Alegre at 2/26/2019  1:45 PM     Author: Manfred Alegre Service: -- Author Type:     Filed: 2/27/2019 11:10 AM Encounter Date: 2/26/2019 Status: Signed    : Manfred Alegre ()    Addended by: MANFRED ALEGRE on: 2/27/2019 11:10 AM        Modules accepted: Orders

## 2021-07-03 NOTE — ADDENDUM NOTE
Addendum Note by Pro Banerjee MD at 3/29/2019  2:39 PM     Author: Pro Banerjee MD Service: -- Author Type: Physician    Filed: 3/29/2019  2:39 PM Date of Service: 3/29/2019  2:39 PM Status: Signed    : Pro Banerjee MD (Physician)       Addendum  created 03/29/19 1439 by Pro Banerjee MD    Intraprocedure Blocks edited, Sign clinical note

## 2021-07-04 NOTE — TELEPHONE ENCOUNTER
Telephone Encounter by Adelaide Youngblood NP at 7/2/2021  9:35 AM     Author: Adelaide Youngblood NP Service: -- Author Type: Nurse Practitioner    Filed: 7/2/2021  9:35 AM Encounter Date: 7/2/2021 Status: Signed    : Adelaide Youngblood NP (Nurse Practitioner)       I sent in a new prescription.    Adelaide Youngblood NP

## 2021-07-04 NOTE — TELEPHONE ENCOUNTER
Telephone Encounter by Viola Hand MA at 7/2/2021 10:05 AM     Author: Viola Hand MA Service: -- Author Type: Medical Assistant    Filed: 7/2/2021 10:06 AM Encounter Date: 7/2/2021 Status: Signed    : Viola Hand MA (Medical Assistant)       Called and informed Pt

## 2021-07-04 NOTE — TELEPHONE ENCOUNTER
"Telephone Encounter by Minda Diaz RN at 7/2/2021  7:58 AM     Author: Minda Diaz RN Service: -- Author Type: Registered Nurse    Filed: 7/2/2021  8:20 AM Encounter Date: 7/2/2021 Status: Signed    : Minda Diaz RN (Registered Nurse)       Please call patient     Patient calling reporting she was seen 6/23/21 for bacterial vaginosis.    Patient reporting she either lost her prescription \"or it was stolen.\"    Patient is requesting refill of Flagyl. Stating she took 1 day of treatment.     Denies change in symptoms.    Requesting refill to [facility] [place] [address].    Minda Diaz RN  [facility] Nurse Advisors    Reason for Disposition  ? Caller requesting a NON-URGENT new prescription or refill and triager unable to refill per department policy    Additional Information  ? Negative: Drug overdose and triager unable to answer question  ? Negative: Caller requesting information unrelated to medicine  ? Negative: Caller requesting a prescription for Strep throat and has a positive culture result  ? Negative: Rash while taking a medication or within 3 days of stopping it  ? Negative: Immunization reaction suspected  ? Negative: Asthma and having symptoms of asthma (cough, wheezing, etc.)  ? Negative: Influenza symptoms and anti-viral med (such as Tamiflu) prescription request  ? Negative: Breastfeeding questions about mother's medicines and diet  ? Negative: Symptom of illness (e.g., headache, abdominal pain, earache, vomiting) that are more than mild  ? Negative: MORE THAN A DOUBLE DOSE of a prescription or over-the-counter (OTC) drug  ? Negative: DOUBLE DOSE (an extra dose or lesser amount) of over-the-counter (OTC) drug and any symptoms (e.g., dizziness, nausea, pain, sleepiness)  ? Negative: DOUBLE DOSE (an extra dose or lesser amount) of prescription drug and any symptoms (e.g., dizziness, nausea, pain, sleepiness)  ? Negative: Took another person's prescription " drug  ? Negative: DOUBLE DOSE (an extra dose or lesser amount) of prescription drug and NO symptoms (Exception: a double dose of antibiotics)  ? Negative: Diabetes drug error or overdose (e.g., took wrong type of insulin or took extra dose)  ? Negative: Caller has medication question about med not prescribed by PCP and triager unable to answer question (e.g., compatibility with other med, storage)  ? Negative: Request for URGENT new prescription or refill of 'essential' medication (i.e., likelihood of harm to patient if not taken) and triager unable to fill per department policy  ? Negative: Prescription not at pharmacy and was prescribed by PCP recently (Exception: triager has access to EMR and prescription is recorded there. Go to Home Care and confirm for pharmacy.)  ? Negative: Pharmacy calling with prescription questions and triager unable to answer question  ? Negative: Caller has URGENT medication question about med that PCP or specialist prescribed and triager unable to answer question  ? Negative: Caller has NON-URGENT medication question about med that PCP or specialist prescribed and triager unable to answer question    Protocols used: MEDICATION QUESTION CALL-A-OH

## 2021-09-08 ENCOUNTER — TELEPHONE (OUTPATIENT)
Dept: FAMILY MEDICINE | Facility: CLINIC | Age: 31
End: 2021-09-08

## 2021-09-08 NOTE — TELEPHONE ENCOUNTER
Reason for Call:  Medication or medication refill:    Do you use a [facility]?  Name of the pharmacy and phone number for the current request:  [facility] [address] [facility]    Name of the medication requested: acxyclocir and metronidazole    Other request: no  Can we leave a detailed message on this number? YES    Phone number patient can be reached at: [phone]  Best Time: anytime  Call taken on 9/8/2021 at 2:02 PM by Dominique Wiseman

## 2021-09-27 ENCOUNTER — OFFICE VISIT (OUTPATIENT)
Dept: FAMILY MEDICINE | Facility: CLINIC | Age: 31
End: 2021-09-27
Payer: COMMERCIAL

## 2021-09-27 VITALS
SYSTOLIC BLOOD PRESSURE: 100 MMHG | HEART RATE: 87 BPM | BODY MASS INDEX: 34.16 KG/M2 | DIASTOLIC BLOOD PRESSURE: 63 MMHG | TEMPERATURE: 98.1 F | WEIGHT: 199 LBS

## 2021-09-27 DIAGNOSIS — N76.0 BV (BACTERIAL VAGINOSIS): ICD-10-CM

## 2021-09-27 DIAGNOSIS — N89.8 VAGINAL DISCHARGE: Primary | ICD-10-CM

## 2021-09-27 DIAGNOSIS — B96.89 BV (BACTERIAL VAGINOSIS): ICD-10-CM

## 2021-09-27 DIAGNOSIS — T83.32XD INTRAUTERINE CONTRACEPTIVE DEVICE THREADS LOST, SUBSEQUENT ENCOUNTER: ICD-10-CM

## 2021-09-27 LAB
CLUE CELLS: PRESENT
TRICHOMONAS, WET PREP: ABNORMAL
WBC'S/HIGH POWER FIELD, WET PREP: ABNORMAL
YEAST, WET PREP: ABNORMAL

## 2021-09-27 PROCEDURE — 87624 HPV HI-RISK TYP POOLED RSLT: CPT | Performed by: FAMILY MEDICINE

## 2021-09-27 PROCEDURE — 87210 SMEAR WET MOUNT SALINE/INK: CPT | Performed by: FAMILY MEDICINE

## 2021-09-27 PROCEDURE — G0123 SCREEN CERV/VAG THIN LAYER: HCPCS | Performed by: FAMILY MEDICINE

## 2021-09-27 PROCEDURE — G0124 SCREEN C/V THIN LAYER BY MD: HCPCS | Performed by: PATHOLOGY

## 2021-09-27 PROCEDURE — 87491 CHLMYD TRACH DNA AMP PROBE: CPT | Performed by: FAMILY MEDICINE

## 2021-09-27 PROCEDURE — 87591 N.GONORRHOEAE DNA AMP PROB: CPT | Performed by: FAMILY MEDICINE

## 2021-09-27 PROCEDURE — 99213 OFFICE O/P EST LOW 20 MIN: CPT | Performed by: FAMILY MEDICINE

## 2021-09-27 RX ORDER — METRONIDAZOLE 500 MG/1
500 TABLET ORAL 2 TIMES DAILY
Qty: 14 TABLET | Refills: 0 | Status: SHIPPED | OUTPATIENT
Start: 2021-09-27 | End: 2021-10-04

## 2021-09-27 RX ORDER — CEPHALEXIN 500 MG/1
CAPSULE ORAL
COMMUNITY
Start: 2020-09-28 | End: 2022-01-05

## 2021-09-27 RX ORDER — METRONIDAZOLE 500 MG/1
TABLET ORAL
COMMUNITY
Start: 2021-07-26 | End: 2021-11-08

## 2021-09-27 RX ORDER — ONDANSETRON 4 MG/1
TABLET, ORALLY DISINTEGRATING ORAL
COMMUNITY
Start: 2020-09-28 | End: 2022-01-05

## 2021-09-27 RX ORDER — OXYCODONE AND ACETAMINOPHEN 5; 325 MG/1; MG/1
TABLET ORAL
COMMUNITY
Start: 2020-09-28 | End: 2022-01-05

## 2021-09-27 NOTE — PROGRESS NOTES
Assessment & Plan    1. Vaginal discharge  This is a patient with h/o recurrent BV.  She has symptoms again - called in for a prescription and was told she needed to be seen for a pap smear.  She does want STD screening as well - discussed.  Cervix/vaginal swabs sent for GC/Chlamydia and wet prep.  Pap smear was also collected.    - Chlamydia trachomatis/Neisseria gonorrhoeae by PCR - Clinic Collect  - Wet prep - Clinic Collect  - Pap Screen with HPV - recommended age 30 - 65 years; Future  - Pap Screen with HPV - recommended age 30 - 65 years  - HPV High Risk Types DNA Cervical    2. Intrauterine contraceptive device threads lost, subsequent encounter  Patient does not have detectable IUD strings.  Desires removal of IUD - will refer to Gyn.    - Ob/Gyn Referral; Future    3. BV (bacterial vaginosis)  Wet prep is positive for wet prep.  Will treat for BV.    - Ob/Gyn Referral; Future  - metroNIDAZOLE (FLAGYL) 500 MG tablet; Take 1 tablet (500 mg) by mouth 2 times daily for 7 days  Dispense: 14 tablet; Refill: 0        Return in about 1 year (around 2022) for Routine preventive.    Chief Complaint   Patient presents with     Gyn Exam      HPI  Having BV symptoms - was told she needed to come in for pap smear  Acknowledges she is due for pap smear -   Last pap smear was 2020 and was positive for LSIL and HR HPV       Patient Active Problem List   Diagnosis     Sickle-cell trait (H)     Nondependent cannabis abuse     Abnormal Pap     Genital herpes     Anemia     Anxiety     Previous  delivery affecting pregnancy     Encounter for supervision of normal pregnancy in third trimester     Family history of genetic disorder     Anemia during pregnancy in third trimester     39 weeks gestation of pregnancy     ASCUS with positive high risk HPV cervical     Symptomatic anemia     Anemia requiring transfusions     AGUILAR (dyspnea on exertion)        Past Medical History:   Diagnosis Date     ASCUS with positive  high risk HPV cervical 2019     Asthma      Chlamydia      History of transfusion 2014    When her IUD came out     Mental disorder     Anxiety     Pregnancy complicated by female genital mutilation 2015     Urinary tract infection         Current Outpatient Medications   Medication     cephALEXin (KEFLEX) 500 MG capsule     metroNIDAZOLE (FLAGYL) 500 MG tablet     metroNIDAZOLE (FLAGYL) 500 MG tablet     ondansetron (ZOFRAN-ODT) 4 MG ODT tab     oxyCODONE-acetaminophen (PERCOCET) 5-325 MG tablet     No current facility-administered medications for this visit.        Past Surgical History:   Procedure Laterality Date     C/SECTION, LOW TRANSVERSE      3 cesareans. last       SECTION      baby's heart rate dropped      SECTION      repeat      SECTION N/A 3/19/2019    Procedure: REPEAT  SECTION,WITH TUBAL LIGATION;  Surgeon: Tino Berger MD;  Location: [facility] OR;  Service: Obstetrics        Social History     Socioeconomic History     Marital status: Single     Spouse name: Not on file     Number of children: Not on file     Years of education: Not on file     Highest education level: Not on file   Occupational History     Not on file   Tobacco Use     Smoking status: Never Smoker     Smokeless tobacco: Never Used     Tobacco comment: no exposure   Substance and Sexual Activity     Alcohol use: No     Comment: Alcoholic Drinks/day: rarely     Drug use: No     Sexual activity: Yes     Partners: Male     Birth control/protection: None   Other Topics Concern     Not on file   Social History Narrative    Lives with baby and older 2 children     Social Determinants of Health     Financial Resource Strain:      Difficulty of Paying Living Expenses:    Food Insecurity:      Worried About Running Out of Food in the Last Year:      Ran Out of Food in the Last Year:    Transportation Needs:      Lack of Transportation (Medical):      Lack of Transportation  (Non-Medical):    Physical Activity:      Days of Exercise per Week:      Minutes of Exercise per Session:    Stress:      Feeling of Stress :    Social Connections:      Frequency of Communication with Friends and Family:      Frequency of Social Gatherings with Friends and Family:      Attends [employer] Services:      Active Member of Clubs or Organizations:      Attends Club or Organization Meetings:      Marital Status:    Intimate Partner Violence:      Fear of Current or Ex-Partner:      Emotionally Abused:      Physically Abused:      Sexually Abused:         Family History   Problem Relation Age of Onset     Sickle Cell Anemia Daughter      Other - See Comments Daughter         Anophthalmia     No Known Problems Mother      No Known Problems Father      No Known Problems Sister      No Known Problems Brother      No Known Problems Sister      No Known Problems Sister         Review of Systems   Genitourinary: Positive for vaginal discharge.        H/o abnormal pap smear - doesn't recall what it was   All other systems reviewed and are negative.       /63 (BP Location: Left arm, Patient Position: Sitting, Cuff Size: Adult Regular)   Pulse 87   Temp 98.1  F (36.7  C) (Temporal)   Wt 90.3 kg (199 lb)   LMP 09/21/2021 (Exact Date)   BMI 34.16 kg/m       Physical Exam  Constitutional:       Appearance: Normal appearance. She is obese.   HENT:      Head: Normocephalic and atraumatic.   Eyes:      Extraocular Movements: Extraocular movements intact.   Cardiovascular:      Rate and Rhythm: Normal rate and regular rhythm.      Heart sounds: Normal heart sounds.   Pulmonary:      Effort: Pulmonary effort is normal.      Breath sounds: Normal breath sounds.   Abdominal:      Palpations: Abdomen is soft.      Tenderness: There is no abdominal tenderness. There is no guarding or rebound.   Genitourinary:     Comments: PELVIC EXAM:External genitalia: normal  Vaginal mucosa normal  Vaginal  discharge:yellow  Speculum exam shows a normal appearing cervix . No IUD strings visible or able to be teased out from os.    Bimanual exam: Cervix closed, firm, non tender  to motion.  Uterus  firm, regular, mobile, non tender to palpation. No adnexal masses or tenderness.     Musculoskeletal:         General: No deformity. Normal range of motion.      Cervical back: Neck supple.   Skin:     General: Skin is warm.   Neurological:      General: No focal deficit present.      Mental Status: She is alert.          Results:  Results for orders placed or performed in visit on 09/27/21   HPV High Risk Types DNA Cervical     Status: None   Result Value Ref Range    Other HR HPV Negative Negative    HPV16 DNA Negative Negative    HPV18 DNA Negative Negative    FINAL DIAGNOSIS       This patient's sample is negative for HPV DNA.        This test was developed and its performance characteristics determined by the [facility]. It has not been cleared or approved by the FDA. The laboratory is regulated under CLIA as qualified to perform high-complexity testing. This test is used for clinical purposes. It should not be regarded as investigational or for research.    METHODOLOGY: The Roche Ran 4800 system uses automated extraction, simultaneous amplification of HPV (L1 region) and beta-globin, followed by real time detection of fluorescent labeled HPV and beta globin using specific oligonucleotide probes. The test specifically identified types HPV 16 DNA and HPV 18 DNA while concurrently detecting the rest of the high risk types (31, 33, 35, 39, 45, 51, 52, 56, 58, 59, 66 or 68).    COMMENTS: This test is not intended for use as a screening device for woman under age 30 with normal cervical cytology. Results should be correlated with cytologic and histologic findings. Close clinical followup is recommended.       Pap Screen with HPV - recommended age 30 - 65 years      Status: Abnormal   Result Value Ref Range    Interpretation (A)       Atypical squamous cells of undetermined significance (ASC-US)    Other Findings  Endometrial cells in a woman >=45 years of age; endometrial cells correlate with menstrual history provided, Trichomonas vaginalis, Fungal organisms morphologically consistent with Candida spp, Shift in vaginal hanny suggestive of bacterial vaginosis,...     Shift in vaginal hanny suggestive of bacterial vaginosis    Specimen Adequacy       Satisfactory for evaluation, endocervical/transformation zone component present    Clinical Information       IUD      LMP/Menopause Date       9/21/2021      HPV Reflex Yes regardless of result     Previous Abnormal?       Yes      Previous Abnormal Diagnosis       LSIL with HR HPV , 8/2020      Performing Labs       The technical component of this testing was completed at [facility]     Chlamydia trachomatis/Neisseria gonorrhoeae by PCR - Clinic Collect     Status: Normal    Specimen: Cervix; Swab   Result Value Ref Range    Chlamydia Trachomatis Negative Negative    Neisseria gonorrhoeae Negative Negative   Wet prep - Clinic Collect     Status: Abnormal    Specimen: Vagina; Swab   Result Value Ref Range    Trichomonas Absent Absent    Yeast Absent Absent    Clue Cells Present (A) Absent    WBCs/high power field 1+ (A) None       Medications at Conclusion of Visit:  Current Outpatient Medications   Medication Sig Dispense Refill     cephALEXin (KEFLEX) 500 MG capsule TAKE ONE CAPSULE BY MOUTH EVERY 8 HOURS FOR 10 DAYS       metroNIDAZOLE (FLAGYL) 500 MG tablet        metroNIDAZOLE (FLAGYL) 500 MG tablet Take 1 tablet (500 mg) by mouth 2 times daily for 7 days 14 tablet 0     ondansetron (ZOFRAN-ODT) 4 MG ODT tab PLACE ONE TABLET ON THE TONGUE EVERY 4 TO 6 HOURS AS NEEDED       oxyCODONE-acetaminophen (PERCOCET) 5-325 MG tablet TAKE 1 T PO EVERY 4 TO 6 HOURS           NATHAN CHANDRA,  MD

## 2021-09-28 LAB
C TRACH DNA SPEC QL PROBE+SIG AMP: NEGATIVE
N GONORRHOEA DNA SPEC QL NAA+PROBE: NEGATIVE

## 2021-09-29 LAB
HUMAN PAPILLOMA VIRUS 16 DNA: NEGATIVE
HUMAN PAPILLOMA VIRUS 18 DNA: NEGATIVE
HUMAN PAPILLOMA VIRUS FINAL DIAGNOSIS: NORMAL
HUMAN PAPILLOMA VIRUS OTHER HR: NEGATIVE

## 2021-09-30 LAB
BKR LAB AP GYN ADEQUACY: ABNORMAL
BKR LAB AP GYN INTERPRETATION: ABNORMAL
BKR LAB AP GYN OTHER FINDINGS: ABNORMAL
BKR LAB AP HPV REFLEX: ABNORMAL
BKR LAB AP LMP: ABNORMAL
BKR LAB AP PREVIOUS ABNL DX: ABNORMAL
BKR LAB AP PREVIOUS ABNORMAL: ABNORMAL
PATH REPORT.COMMENTS IMP SPEC: ABNORMAL
PATH REPORT.RELEVANT HX SPEC: ABNORMAL

## 2021-10-05 ENCOUNTER — PATIENT OUTREACH (OUTPATIENT)
Dept: FAMILY MEDICINE | Facility: CLINIC | Age: 31
End: 2021-10-05

## 2021-10-05 NOTE — LETTER
October 5, 2021      Kitty S Bueno  [address] [place] [address]        Dear MsKaren,    This letter is regarding your recent Pap smear and Human Papillomavirus (HPV) test.    Your results showed Atypical Squamous Cells of Undetermined Significance (ASCUS) and HPV negative, meaning that no high-risk HPV was found at this time.    It is recommended that you have your next Pap smear and Human Papillomavirus (HPV) test in 1 year.    If you have additional questions regarding this result, please contact our Registered Nurse, Stefanie, at [phone].      Sincerely,    Your [facility] Care Team

## 2021-10-16 ENCOUNTER — HEALTH MAINTENANCE LETTER (OUTPATIENT)
Age: 31
End: 2021-10-16

## 2021-11-01 ENCOUNTER — TELEPHONE (OUTPATIENT)
Dept: FAMILY MEDICINE | Facility: CLINIC | Age: 31
End: 2021-11-01

## 2021-11-01 NOTE — TELEPHONE ENCOUNTER
Attempted to return call. Phone number not in service, unable to leave message.    If patient returns call, please forward to nurse for triage.     Patient is requesting refill of flagyl. Per provider's notes from last visit (09/27/2021) further treatment will have to be done through OB/GYN. Referral was place.    Dora Dixon RN   [facility]/Triage Nurse

## 2021-11-01 NOTE — TELEPHONE ENCOUNTER
Reason for Call:  Medication or medication refill:    Do you use a [facility]?  Name of the pharmacy and phone number for the current request:  [facility] [address]    Name of the medication requested: metronidazole    Other request:     Can we leave a detailed message on this number? Not Applicable    Phone number patient can be reached at: Home number on file [phone] (home)    Best Time: asap    Call taken on 11/1/2021 at 1:28 PM by Clara Campos

## 2021-11-02 NOTE — TELEPHONE ENCOUNTER
RN Attempt #2 Unable to leave VM    Please see below    Dora Dixon RN   [facility]/Triage Nurse

## 2021-11-03 NOTE — TELEPHONE ENCOUNTER
Unable to reach pt x3 times, encounter closed and medication refused. If the pt calls back please find out why she is needing Metronidazole.

## 2021-11-03 NOTE — TELEPHONE ENCOUNTER
RN attempt # 3 to contact patient. Was still unable to leave message for patient. If patient returns call please see thread below.       Dora Dixon RN   [facility]/Triage Nurse

## 2021-11-06 ENCOUNTER — NURSE TRIAGE (OUTPATIENT)
Dept: NURSING | Facility: CLINIC | Age: 31
End: 2021-11-06
Payer: COMMERCIAL

## 2021-11-06 NOTE — TELEPHONE ENCOUNTER
Pt called stating she bacterial vaginosis requesting metronidazole prescription. Pt reported she has white discharge and foul smelling order. Pt also reported she has leisure break out every time once she gets her period.  Pt also requesting her genital herpes.       RN paged on call provider, received a return call from Dr Ashley Preston, provider was informed and she prescribed metronidazole cream at night time for 5 days, pt refused stating it causes vaginal iritation. RN paged on call provider and informed her pt refused the cream and provider advised pt to go to the urgent care to be seen.    RN called pt back and informed her to go to the urgent care and she stated okay.     Jayson Rivera RN  [facility] Nurse Advisors     COVID 19 Nurse Triage Plan/Patient Instructions    Please be aware that novel coronavirus (COVID-19) may be circulating in the community. If you develop symptoms such as fever, cough, or SOB or if you have concerns about the presence of another infection including coronavirus (COVID-19), please contact your health care provider or visit [email or web address].     Disposition/Instructions    In-Person Visit with provider recommended. Reference Visit Selection Guide.    Thank you for taking steps to prevent the spread of this virus.  o Limit your contact with others.  o Wear a simple mask to cover your cough.  o Wash your hands well and often.    Resources    [facility]: About COVID-19: [email or web address]    CDC: What to Do If You're Sick: www.cdc.gov/coronavirus/2019-ncov/about/steps-when-sick.html    CDC: Ending Home Isolation: www.cdc.gov/coronavirus/2019-ncov/hcp/disposition-in-home-patients.html     CDC: Caring for Someone: www.cdc.gov/coronavirus/2019-ncov/if-you-are-sick/care-for-someone.html     [facility]: Interim Guidance for Hospital Discharge to Home: www.health.[facility].[place]/diseases/coronavirus/hcp/hospdischarge.pdf    [facility] clinical trials  "(COVID-19 research studies): clinicalaffairs.[facility]-clinical-trials     Below are the COVID-19 hotlines at the [place] Department of Health ([facility]). Interpreters are available.   o For health questions: Call 205-604-4001 or 1-273.261.5258 (7 a.m. to 7 p.m.)  o For questions about schools and childcare: Call 252-711-3953 or 1-650.861.6032 (7 a.m. to 7 p.m.)           Reason for Disposition    [1] Symptoms of a yeast infection (i.e., itchy, white discharge, not bad smelling) AND [2] not improved > 3 days following CARE ADVICE    Additional Information    Negative: Sounds like a life-threatening emergency to the triager    Negative: Followed a genital area injury    Negative: Foreign body in vagina (e.g., tampon)    Negative: Vaginal bleeding is main symptom    Negative: Vaginal discharge is main symptom    Negative: Pain or burning with passing urine (urination) is main symptom    Negative: Menstrual cramps is main symptom    Negative: Abdomen pain is main symptom    Negative: Pubic lice suspected    Negative: Itching or rash of external female genital area (vulva)    Negative: Labor suspected    Negative: Patient sounds very sick or weak to the triager    Negative: [1] SEVERE pain AND [2] not improved 2 hours after pain medicine    Negative: [1] Genital area looks infected (e.g., draining sore, spreading redness) AND [2] fever    Negative: [1] Something is hanging out of the vagina AND [2] can't easily be pushed back inside    Negative: MODERATE-SEVERE itching (i.e., interferes with school, work, or sleep)    Negative: [1] MILD-MODERATE pain AND [2] present > 24 hours    Negative: Genital area looks infected (e.g., draining sore, spreading redness)    Negative: Rash with painful tiny water blisters    Negative: [1] Rash (e.g., redness, tiny bumps, sore) of genital area AND [2] present > 24 hours    Negative: Tender lump (swelling or \"ball\") at vaginal opening    Protocols used: VAGINAL SYMPTOMS-A-AH      "

## 2021-11-08 DIAGNOSIS — N76.0 BACTERIAL VAGINOSIS: Primary | ICD-10-CM

## 2021-11-08 DIAGNOSIS — B96.89 BACTERIAL VAGINOSIS: Primary | ICD-10-CM

## 2021-11-08 NOTE — TELEPHONE ENCOUNTER
Reason for Call:  Other call back and prescription    Detailed comments: PT NEEDS REFILL FOR METRONIDAZOLE TABS, 500 MG. SHE IS OUT AND NEEDS REFILL RIGHT AWAY    Phone Number Patient can be reached at: Cell number on file:    Telephone Information:   Mobile [phone]       Best Time: ANYTIME    Can we leave a detailed message on this number? YES    Call taken on 11/8/2021 at 2:42 PM by Karlee Warner

## 2021-11-08 NOTE — TELEPHONE ENCOUNTER
Please see nurse triage from 11/06/2021.     Patient is requesting metronidazole for bacterial vaginosis. Requesting tabs as cream was causing vaginal irritation.     Dora Dixon RN   [facility]Triage Nurse

## 2021-11-09 RX ORDER — METRONIDAZOLE 500 MG/1
500 TABLET ORAL 2 TIMES DAILY
Qty: 14 TABLET | Refills: 0 | Status: SHIPPED | OUTPATIENT
Start: 2021-11-09 | End: 2021-11-16

## 2021-11-09 NOTE — TELEPHONE ENCOUNTER
RN spoke to pt regarding Dr. So message below. Pt verbalizes understanding, agree with plan and has no further questions.    CHIDI Kinsey, RN   [facility]

## 2021-11-09 NOTE — TELEPHONE ENCOUNTER
Due for physical. Please schedule.    No future appointments.   Health Maintenance Due   Topic Date Due     ANNUAL REVIEW OF HM ORDERS  Never done     ADVANCE CARE PLANNING  Never done     Pneumococcal Vaccine: Pediatrics (0 to 5 Years) and At-Risk Patients (6 to 64 Years) (1 of 4 - PCV13) Never done     COVID-19 Vaccine (1) Never done     PREVENTIVE CARE VISIT  02/16/2017     INFLUENZA VACCINE (1) 09/01/2021     BP Readings from Last 3 Encounters:   09/27/21 100/63   06/23/21 116/68   04/22/21 102/56      Office Visit on 09/27/2021   Component Date Value Ref Range Status     Chlamydia Trachomatis 09/27/2021 Negative  Negative Final    Negative for C. trachomatis rRNA by transcription mediated amplification.   A negative result by transcription mediated amplification does not preclude the presence of infection because results are dependent on proper and adequate collection, absence of inhibitors and sufficient rRNA to be detected.     Neisseria gonorrhoeae 09/27/2021 Negative  Negative Final    Negative for N. gonorrhoeae rRNA by transcription mediated amplification. A negative result by transcription mediated amplification does not preclude the presence of C. trachomatis infection because results are dependent on proper and adequate collection, absence of inhibitors and sufficient rRNA to be detected.     Trichomonas 09/27/2021 Absent  Absent Final     Yeast 09/27/2021 Absent  Absent Final     Clue Cells 09/27/2021 Present* Absent Final     WBCs/high power field 09/27/2021 1+* None Final     Interpretation 09/27/2021 Atypical squamous cells of undetermined significance (ASC-US)*   Final     Other Findings 09/27/2021 Shift in vaginal hanny suggestive of bacterial vaginosis  Endometrial cells in a woman >=45 years of age; endometrial cells correlate with menstrual history provided, Trichomonas vaginalis, Fungal organisms morphologically consistent with Candida spp, Shift in vaginal hanny suggestive of bacterial  vaginosis,... Final     Specimen Adequacy 09/27/2021 Satisfactory for evaluation, endocervical/transformation zone component present   Final     Clinical Information 09/27/2021    Final                    Value:This result contains rich text formatting which cannot be displayed here.     LMP/Menopause Date 09/27/2021    Final                    Value:This result contains rich text formatting which cannot be displayed here.     HPV Reflex 09/27/2021 Yes regardless of result   Final     Previous Abnormal? 09/27/2021    Final                    Value:This result contains rich text formatting which cannot be displayed here.     Previous Abnormal Diagnosis 09/27/2021    Final                    Value:This result contains rich text formatting which cannot be displayed here.     Performing Labs 09/27/2021    Final                    Value:This result contains rich text formatting which cannot be displayed here.     Other HR HPV 09/27/2021 Negative  Negative Final     HPV16 DNA 09/27/2021 Negative  Negative Final     HPV18 DNA 09/27/2021 Negative  Negative Final     FINAL DIAGNOSIS 09/27/2021    Final                    Value:This result contains rich text formatting which cannot be displayed here.

## 2022-01-05 ENCOUNTER — OFFICE VISIT (OUTPATIENT)
Dept: FAMILY MEDICINE | Facility: CLINIC | Age: 32
End: 2022-01-05
Payer: COMMERCIAL

## 2022-01-05 VITALS
TEMPERATURE: 97.4 F | DIASTOLIC BLOOD PRESSURE: 73 MMHG | HEART RATE: 76 BPM | OXYGEN SATURATION: 98 % | RESPIRATION RATE: 14 BRPM | BODY MASS INDEX: 34.57 KG/M2 | WEIGHT: 202.5 LBS | SYSTOLIC BLOOD PRESSURE: 111 MMHG | HEIGHT: 64 IN

## 2022-01-05 DIAGNOSIS — Z01.818 PREOP GENERAL PHYSICAL EXAM: Primary | ICD-10-CM

## 2022-01-05 DIAGNOSIS — Z23 NEED FOR PROPHYLACTIC VACCINATION AND INOCULATION AGAINST INFLUENZA: ICD-10-CM

## 2022-01-05 DIAGNOSIS — D57.3 SICKLE-CELL TRAIT (H): ICD-10-CM

## 2022-01-05 DIAGNOSIS — Z86.2 HISTORY OF ANEMIA: ICD-10-CM

## 2022-01-05 PROBLEM — D64.9 SYMPTOMATIC ANEMIA: Status: RESOLVED | Noted: 2020-10-05 | Resolved: 2022-01-05

## 2022-01-05 PROBLEM — R06.09 DOE (DYSPNEA ON EXERTION): Status: RESOLVED | Noted: 2020-10-05 | Resolved: 2022-01-05

## 2022-01-05 PROBLEM — Z3A.39 39 WEEKS GESTATION OF PREGNANCY: Status: RESOLVED | Noted: 2019-03-19 | Resolved: 2022-01-05

## 2022-01-05 PROBLEM — R87.610 ASCUS WITH POSITIVE HIGH RISK HPV CERVICAL: Status: ACTIVE | Noted: 2020-08-31

## 2022-01-05 PROBLEM — R87.810 ASCUS WITH POSITIVE HIGH RISK HPV CERVICAL: Status: ACTIVE | Noted: 2020-08-31

## 2022-01-05 PROBLEM — O99.013 ANEMIA DURING PREGNANCY IN THIRD TRIMESTER: Status: RESOLVED | Noted: 2019-01-31 | Resolved: 2022-01-05

## 2022-01-05 PROBLEM — Z84.89 FAMILY HISTORY OF GENETIC DISORDER: Status: RESOLVED | Noted: 2018-07-25 | Resolved: 2022-01-05

## 2022-01-05 PROBLEM — D64.9 ANEMIA REQUIRING TRANSFUSIONS: Status: RESOLVED | Noted: 2020-10-05 | Resolved: 2022-01-05

## 2022-01-05 PROBLEM — Z34.93 ENCOUNTER FOR SUPERVISION OF NORMAL PREGNANCY IN THIRD TRIMESTER: Status: RESOLVED | Noted: 2019-01-14 | Resolved: 2022-01-05

## 2022-01-05 LAB
ALBUMIN SERPL-MCNC: 3.8 G/DL (ref 3.5–5)
ALBUMIN UR-MCNC: NEGATIVE MG/DL
ALP SERPL-CCNC: 62 U/L (ref 45–120)
ALT SERPL W P-5'-P-CCNC: 16 U/L (ref 0–45)
ANION GAP SERPL CALCULATED.3IONS-SCNC: 11 MMOL/L (ref 5–18)
APPEARANCE UR: CLEAR
APTT PPP: 30 SECONDS (ref 22–38)
AST SERPL W P-5'-P-CCNC: 19 U/L (ref 0–40)
BACTERIA #/AREA URNS HPF: ABNORMAL /HPF
BILIRUB SERPL-MCNC: 0.5 MG/DL (ref 0–1)
BILIRUB UR QL STRIP: NEGATIVE
BUN SERPL-MCNC: 8 MG/DL (ref 8–22)
CALCIUM SERPL-MCNC: 9.5 MG/DL (ref 8.5–10.5)
CHLORIDE BLD-SCNC: 107 MMOL/L (ref 98–107)
CK SERPL-CCNC: 123 U/L (ref 30–190)
CO2 SERPL-SCNC: 22 MMOL/L (ref 22–31)
COLOR UR AUTO: YELLOW
CREAT SERPL-MCNC: 1.03 MG/DL (ref 0.6–1.1)
ERYTHROCYTE [DISTWIDTH] IN BLOOD BY AUTOMATED COUNT: 11.9 % (ref 10–15)
GFR SERPL CREATININE-BSD FRML MDRD: 74 ML/MIN/1.73M2
GLUCOSE BLD-MCNC: 85 MG/DL (ref 70–125)
GLUCOSE UR STRIP-MCNC: NEGATIVE MG/DL
HCG SERPL QL: NEGATIVE
HCT VFR BLD AUTO: 36.1 % (ref 35–47)
HGB BLD-MCNC: 12.3 G/DL (ref 11.7–15.7)
HGB UR QL STRIP: ABNORMAL
HIV 1+2 AB+HIV1 P24 AG SERPL QL IA: NEGATIVE
INR PPP: 1.04 (ref 0.85–1.15)
KETONES UR STRIP-MCNC: NEGATIVE MG/DL
LEUKOCYTE ESTERASE UR QL STRIP: NEGATIVE
MCH RBC QN AUTO: 32.2 PG (ref 26.5–33)
MCHC RBC AUTO-ENTMCNC: 34.1 G/DL (ref 31.5–36.5)
MCV RBC AUTO: 95 FL (ref 78–100)
NITRATE UR QL: NEGATIVE
PH UR STRIP: 5.5 [PH] (ref 5–8)
PLATELET # BLD AUTO: 325 10E3/UL (ref 150–450)
POTASSIUM BLD-SCNC: 4.2 MMOL/L (ref 3.5–5)
PROT SERPL-MCNC: 7.7 G/DL (ref 6–8)
RBC # BLD AUTO: 3.82 10E6/UL (ref 3.8–5.2)
RBC #/AREA URNS AUTO: ABNORMAL /HPF
SODIUM SERPL-SCNC: 140 MMOL/L (ref 136–145)
SP GR UR STRIP: 1.01 (ref 1–1.03)
SQUAMOUS #/AREA URNS AUTO: ABNORMAL /LPF
UROBILINOGEN UR STRIP-ACNC: 0.2 E.U./DL
WBC # BLD AUTO: 9.2 10E3/UL (ref 4–11)
WBC #/AREA URNS AUTO: ABNORMAL /HPF

## 2022-01-05 PROCEDURE — 99214 OFFICE O/P EST MOD 30 MIN: CPT | Performed by: FAMILY MEDICINE

## 2022-01-05 PROCEDURE — 80307 DRUG TEST PRSMV CHEM ANLYZR: CPT | Mod: 90 | Performed by: FAMILY MEDICINE

## 2022-01-05 PROCEDURE — 85610 PROTHROMBIN TIME: CPT | Performed by: FAMILY MEDICINE

## 2022-01-05 PROCEDURE — 81001 URINALYSIS AUTO W/SCOPE: CPT | Mod: 59 | Performed by: FAMILY MEDICINE

## 2022-01-05 PROCEDURE — 99000 SPECIMEN HANDLING OFFICE-LAB: CPT | Performed by: FAMILY MEDICINE

## 2022-01-05 PROCEDURE — 87389 HIV-1 AG W/HIV-1&-2 AB AG IA: CPT | Performed by: FAMILY MEDICINE

## 2022-01-05 PROCEDURE — 82550 ASSAY OF CK (CPK): CPT | Performed by: FAMILY MEDICINE

## 2022-01-05 PROCEDURE — 80053 COMPREHEN METABOLIC PANEL: CPT | Performed by: FAMILY MEDICINE

## 2022-01-05 PROCEDURE — 36415 COLL VENOUS BLD VENIPUNCTURE: CPT | Performed by: FAMILY MEDICINE

## 2022-01-05 PROCEDURE — 84703 CHORIONIC GONADOTROPIN ASSAY: CPT | Performed by: FAMILY MEDICINE

## 2022-01-05 PROCEDURE — 85027 COMPLETE CBC AUTOMATED: CPT | Performed by: FAMILY MEDICINE

## 2022-01-05 PROCEDURE — 85730 THROMBOPLASTIN TIME PARTIAL: CPT | Performed by: FAMILY MEDICINE

## 2022-01-05 ASSESSMENT — MIFFLIN-ST. JEOR: SCORE: 1618.53

## 2022-01-05 NOTE — PROGRESS NOTES
[facility] [address] [place] [address]  Phone: [phone]  Fax: [phone]  Primary Provider: Stephan Dotson  {FV AMB Performing Provider (Optional):656171}    {Provider  Link to PREOP SmartSet  Use this to apply standard patient instructions to AVS; includes medication directions, common orders, guidelines for anemia, warfarin, additional testing   :190185}  PREOPERATIVE EVALUATION:  Today's date: 1/5/2022    Kitty Bueno is a 31 year old female who presents for a preoperative evaluation.    Surgical Information:  Surgery/Procedure: ***  Surgery Location: ***  Surgeon: ***  Surgery Date: 1/31/2022  Time of Surgery: ***  Where patient plans to recover: {Preop post recovery plans :724065}  Fax number for surgical facility: {SURGERY FAX NUMBER:[id number]}    Type of Anesthesia Anticipated: {ANESTHESIA:576172}    {2021 Provider Charting Preference for Preop :175686}    Subjective     HPI related to upcoming procedure: ***    Preop Questions 1/5/2022   1. Have you ever had a heart attack or stroke? No   2. Have you ever had surgery on your heart or blood vessels, such as a stent placement, a coronary artery bypass, or surgery on an artery in your head, neck, heart, or legs? No   3. Do you have chest pain with activity? No   4. Do you have a history of  heart failure? No   5. Do you currently have a cold, bronchitis or symptoms of other infection? No   6. Do you have a cough, shortness of breath, or wheezing? No   7. Do you or anyone in your family have previous history of blood clots? No   8. Do you or does anyone in your family have a serious bleeding problem such as prolonged bleeding following surgeries or cuts? No   9. Have you ever had problems with anemia or been told to take iron pills? YES - ***   10. Have you had any abnormal blood loss such as black, tarry or bloody stools, or abnormal vaginal bleeding? No   11. Have you ever had a blood transfusion? YES - ***   11a.  Have you ever had a transfusion reaction? No   12. Are you willing to have a blood transfusion if it is medically needed before, during, or after your surgery? Yes   13. Have you or any of your relatives ever had problems with anesthesia? No   14. Do you have sleep apnea, excessive snoring or daytime drowsiness? No   15. Do you have any artifical heart valves or other implanted medical devices like a pacemaker, defibrillator, or continuous glucose monitor? No   16. Do you have artificial joints? No   17. Are you allergic to latex? No   18. Is there any chance that you may be pregnant? No   {Chronic problem details (Optional) :251816}    Review of Systems  {ROS Preop Choices:257447}    Patient Active Problem List    Diagnosis Date Noted     Symptomatic anemia 10/05/2020     Priority: Medium     Anemia requiring transfusions 10/05/2020     Priority: Medium     AGUILAR (dyspnea on exertion) 10/05/2020     Priority: Medium     ASCUS with positive high risk HPV cervical 08/31/2020     Priority: Medium     2/13/14 NIL pap  2/16/16 NIL pap, neg HPV  1/31/18 ASCUS, +HR HPV (not 16/18)  11/1/18 NIL pap  7/22/19 ASCUS, +HR HPV (not 16/18). Colposcopy recommended  8/17/20 LSIL, +HR HPV (not 16/18). Plan: colposcopy, due before 11/17/20  3/31/21 Lost to follow-up for pap tracking   9/27/21 ASCUS, neg HPV. 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  Has medication to take at 36 weeks.         Anemia 2015     Priority: Medium     Anxiety 2015     Priority: Medium     Abnormal Pap 2014     Priority: Medium     Overview:   NILM ,,,  Plan pap 2017         Nondependent cannabis abuse 2010     Priority: Medium     Overview:   No THC for 2 months as of 10/27/14  History of [facility] with first pregnancy  Followed by Brigida Aquino, [facility]         Sickle-cell trait (H) 2010     Priority: Medium     Overview:   Daughter has sickle cell disease, but this is different FOB. Advised that   he needs testing.  Urine culture each trimester          Past Medical History:   Diagnosis Date     ASCUS with positive high risk HPV cervical 2019     Asthma      Chlamydia      History of transfusion 2014    When her IUD came out     Mental disorder     Anxiety     Pregnancy complicated by female genital mutilation 2015     Urinary tract infection      Past Surgical History:   Procedure Laterality Date     C/SECTION, LOW TRANSVERSE      3 cesareans. last       SECTION      baby's heart rate dropped      SECTION      repeat      SECTION N/A 3/19/2019    Procedure: REPEAT  SECTION,WITH TUBAL LIGATION;  Surgeon: Tino Berger MD;  Location: [facility];  Service: Obstetrics     Current Outpatient Medications   Medication Sig Dispense Refill     cephALEXin (KEFLEX) 500 MG capsule TAKE ONE CAPSULE BY MOUTH EVERY 8 HOURS FOR 10 DAYS (Patient not taking: Reported on 2022)       ondansetron (ZOFRAN-ODT) 4 MG ODT tab PLACE ONE TABLET ON THE TONGUE EVERY 4 TO 6 HOURS AS NEEDED (Patient not taking: Reported  "on 2022)       oxyCODONE-acetaminophen (PERCOCET) 5-325 MG tablet TAKE 1 T PO EVERY 4 TO 6 HOURS (Patient not taking: Reported on 2022)         No Known Allergies     Social History     Tobacco Use     Smoking status: Never Smoker     Smokeless tobacco: Never Used     Tobacco comment: no exposure   Substance Use Topics     Alcohol use: No     Comment: Alcoholic Drinks/day: rarely     {FAMILY HISTORY (Optional):048686572}  History   Drug Use No         Objective     /73 (BP Location: Right arm, Patient Position: Sitting, Cuff Size: Adult Large)   Pulse 76   Temp 97.4  F (36.3  C) (Temporal)   Resp 14   Ht 1.626 m (5' 4\")   Wt 91.9 kg (202 lb 8 oz)   LMP 2022   SpO2 98%   BMI 34.76 kg/m      Physical Exam  {EXAM Preop Choices:516798}    Recent Labs   Lab Test 10/06/20  0638 10/05/20  1726 10/05/20  1047 10/05/20  1047 20  1329 20  1329   HGB 8.0* 7.7* 6.6*  --    < >  --      --  436  --    < >  --    INR  --   --   --   --   --  1.09     --   --  139   < >  --    POTASSIUM 4.1  --   --  3.9   < >  --    CR 0.91  --   --  0.90   < >  --     < > = values in this interval not displayed.        Diagnostics:  {LABS:079149}   {EK}    Revised Cardiac Risk Index (RCRI):  The patient has the following serious cardiovascular risks for perioperative complications:  {PREOP REVISED CARDIAC RISK INDEX (RCRI) :333687::\" - No serious cardiac risks = 0 points\"}     RCRI Interpretation: {REVISED CARDIAC RISK INTERPRETATION :996568}         Signed Electronically by: Stephan Dotson MD  Copy of this evaluation report is provided to requesting physician.    {Provider Resources  Preop [facility] Preop Guidelines  Revised Cardiac Risk Index :476267}  "

## 2022-01-05 NOTE — PROGRESS NOTES
[facility] [address] [place] [address]  Phone: [phone]   Fax: [phone]  Primary Provider: Stephan Lam  Pre-op Performing Provider: STEPHAN LAM    PREOPERATIVE EVALUATION:  Today's date: 1/5/2022    Kitty Bueno is a 31 year old female who presents for a preoperative evaluation.    Surgical Information:  Surgery/Procedure: BBL  Surgery Location: [facility]   Surgeon:   Surgery Date: 01/31/2022  Time of Surgery: TBD   Where patient plans to recover: Other: AT the center for a week or so and fly back home for the rest  Fax number for surgical facility: [phone]     Type of Anesthesia Anticipated: General    Assessment & Plan     The proposed surgical procedure is considered MODERATE risk.     Preop general physical exam  Elective cosmetic surgery.  Discussed inherent dangerous of tourism surgery.  Patient wishes to proceed.  Labs requested to be done by surgery center ordered today:    - Asymptomatic COVID-19 Virus (Coronavirus) by PCR Nose; Future  - CBC with platelets  - Comprehensive metabolic panel (BMP + Alb, Alk Phos, ALT, AST, Total. Bili, TP)  - INR  - Partial thromboplastin time  - HCG qualitative  - HIV Antigen Antibody Combo    Sickle-cell trait (H)    History of anemia  Postoperative anemia after liposuction procedure in 09/2020 resulting in a blood transfusion in 10/2020.  - CBC with platelets  Normal Hgb today.    Need for prophylactic vaccination and inoculation against influenza  - HC FLU VAC PRESRV FREE QUAD SPLIT VIR 3+YRS IM  [42428]    Risks and Recommendations:  The patient has the following additional risks and recommendations for perioperative complications:   - No identified additional risk factors other than previously addressed    Medication Instructions:  Patient is on no chronic medications    RECOMMENDATION:  APPROVAL GIVEN to proceed with proposed procedure, without further diagnostic  evaluation.    Subjective     HPI related to upcoming procedure: History of anemia requiring blood transfusion after liposuction procedure in 2020    Preop Questions 1/5/2022   1. Have you ever had a heart attack or stroke? No   2. Have you ever had surgery on your heart or blood vessels, such as a stent placement, a coronary artery bypass, or surgery on an artery in your head, neck, heart, or legs? No   3. Do you have chest pain with activity? No   4. Do you have a history of  heart failure? No   5. Do you currently have a cold, bronchitis or symptoms of other infection? No   6. Do you have a cough, shortness of breath, or wheezing? No   7. Do you or anyone in your family have previous history of blood clots? No   8. Do you or does anyone in your family have a serious bleeding problem such as prolonged bleeding following surgeries or cuts? No   9. Have you ever had problems with anemia or been told to take iron pills? YES - postoperative 2020   10. Have you had any abnormal blood loss such as black, tarry or bloody stools, or abnormal vaginal bleeding? No   11. Have you ever had a blood transfusion? YES - 10/2020   11a. Have you ever had a transfusion reaction? No   12. Are you willing to have a blood transfusion if it is medically needed before, during, or after your surgery? Yes   13. Have you or any of your relatives ever had problems with anesthesia? No   14. Do you have sleep apnea, excessive snoring or daytime drowsiness? No   15. Do you have any artifical heart valves or other implanted medical devices like a pacemaker, defibrillator, or continuous glucose monitor? No   16. Do you have artificial joints? No   17. Are you allergic to latex? No   18. Is there any chance that you may be pregnant? No     Status of Chronic Conditions:  None    Review of Systems  CONSTITUTIONAL: NEGATIVE for fever, chills, change in weight  INTEGUMENTARY/SKIN: NEGATIVE for worrisome rashes, moles or lesions  EYES: NEGATIVE for  vision changes or irritation  ENT/MOUTH: NEGATIVE for ear, mouth and throat problems  RESP: NEGATIVE for significant cough or SOB  CV: NEGATIVE for chest pain, palpitations or peripheral edema  GI: NEGATIVE for nausea, abdominal pain, heartburn, or change in bowel habits  : NEGATIVE for frequency, dysuria, or hematuria  MUSCULOSKELETAL: NEGATIVE for significant arthralgias or myalgia  NEURO: NEGATIVE for weakness, dizziness or paresthesias  ENDOCRINE: NEGATIVE for temperature intolerance, skin/hair changes  HEME: NEGATIVE for bleeding problems  PSYCHIATRIC: NEGATIVE for changes in mood or affect    Patient Active Problem List    Diagnosis Date Noted     ASCUS with positive high risk HPV cervical 08/31/2020     Priority: Medium     2/13/14 NIL pap  2/16/16 NIL pap, neg HPV  1/31/18 ASCUS, +HR HPV (not 16/18)  11/1/18 NIL pap  7/22/19 ASCUS, +HR HPV (not 16/18). Colposcopy recommended  8/17/20 LSIL, +HR HPV (not 16/18). Plan: colposcopy, due before 11/17/20  3/31/21 Lost to follow-up for pap tracking   9/27/21 ASCUS, neg HPV. Plan: cotest in 1 year per provider  10/5/21 Result letter sent    Formatting of this note might be different from the original.  2/13/14 NIL pap  2/16/16 NIL pap, neg HPV  1/31/18 ASCUS, +HR HPV (not 16/18)  11/1/18 NIL pap  7/22/19 ASCUS, +HR HPV (not 16/18). Colposcopy recommended  8/17/20 LSIL, +HR HPV (not 16/18). Plan: colposcopy, due before 11/17/20 8/31/20 pt notified   10/16/20 MyChart reminder sent.  10/27/20 MyChart not read, letter sent.  11/17/20 [place] not done, chart and tracking updated for 6mo colp/pap, due 2/17/21.  2/3/21 Reminder mychart -- not read  3/3/21 Reminder call -- Pt notified   3/31/21 Lost to follow-up for pap tracking       Genital herpes 03/31/2015     Priority: Medium     Only 1 outbreak         Abnormal Pap 02/20/2014     Priority: Medium     Overview:   NILM 2009,2010,2011,2014  Plan pap 2/2017         Cervical cancer screening 02/20/2014     Priority: Medium      Formatting of this note might be different from the original.  NILM 2009,,,  2018 NILM 28 y.o.  PLAN, per ASCCP Guidelines: cotest 2021       Sickle-cell trait (H) 2010     Priority: Medium     Overview:   Daughter has sickle cell disease, but this is different FOB. Advised that   he needs testing.  Urine culture each trimester          Past Medical History:   Diagnosis Date     Anemia requiring transfusions 10/5/2020     ASCUS with positive high risk HPV cervical 2019     Asthma      Chlamydia      Family history of genetic disorder 2018    Overview:  Refer to [facility] for genetic counseling Three year 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) Other delays include speech and movement Routine counseling by genetic counselor, no particular genetic test for  eye condition as can have multiple cause Rufus tested for sickle cell, per Rufus, does not have sickle cell  trait/carrier      History of transfusion 2014    When her IUD came out     Mental disorder     Anxiety     Pregnancy complicated by female genital mutilation 2015     Symptomatic anemia 10/5/2020     Urinary tract infection      Past Surgical History:   Procedure Laterality Date     C/SECTION, LOW TRANSVERSE      3 cesareans. last       SECTION      baby's heart rate dropped      SECTION      repeat      SECTION N/A 3/19/2019    Procedure: REPEAT  SECTION,WITH TUBAL LIGATION;  Surgeon: Tino Berger MD;  Location: [facility];  Service: Obstetrics     Liposuction 2020      12 spots- blood transfusion a few weeks later     No current outpatient medications on file.       No Known Allergies     Social History     Tobacco Use     Smoking status: Never Smoker     Smokeless tobacco: Never Used     Tobacco comment: no exposure   Substance Use Topics     Alcohol use: No     Comment: Alcoholic Drinks/day: rarely     Family History   Problem Relation Age of Onset  "    Sickle Cell Anemia Daughter      Other - See Comments Daughter         Anophthalmia     No Known Problems Mother      No Known Problems Father      No Known Problems Sister      No Known Problems Brother      No Known Problems Sister      No Known Problems Sister      History   Drug Use No         Objective     /73 (BP Location: Right arm, Patient Position: Sitting, Cuff Size: Adult Large)   Pulse 76   Temp 97.4  F (36.3  C) (Temporal)   Resp 14   Ht 1.626 m (5' 4\")   Wt 91.9 kg (202 lb 8 oz)   LMP 01/05/2022   SpO2 98%   BMI 34.76 kg/m      Physical Exam    GENERAL APPEARANCE: healthy, alert and no distress     EYES: EOMI, PERRL     HENT: ear canals and TM's normal and nose and mouth without ulcers or lesions     NECK: no adenopathy, no asymmetry, masses, or scars and thyroid normal to palpation     RESP: lungs clear to auscultation - no rales, rhonchi or wheezes     CV: regular rates and rhythm, normal S1 S2, no S3 or S4 and no murmur, click or rub     ABDOMEN:  soft, nontender, no HSM or masses and bowel sounds normal     MS: extremities normal- no gross deformities noted, no evidence of inflammation in joints, FROM in all extremities.     SKIN: no suspicious lesions or rashes     NEURO: Normal strength and tone, sensory exam grossly normal, mentation intact and speech normal     PSYCH: mentation appears normal. and affect normal/bright     LYMPHATICS: No cervical adenopathy    Recent Labs   Lab Test 10/06/20  0638 10/05/20  1726 10/05/20  1047 10/05/20  1047 09/11/20  1329 09/11/20  1329   HGB 8.0* 7.7* 6.6*  --    < >  --      --  436  --    < >  --    INR  --   --   --   --   --  1.09     --   --  139   < >  --    POTASSIUM 4.1  --   --  3.9   < >  --    CR 0.91  --   --  0.90   < >  --     < > = values in this interval not displayed.      Recent Results (from the past 240 hour(s))   CBC with platelets    Collection Time: 01/05/22 12:30 PM   Result Value Ref Range    WBC Count " 9.2 4.0 - 11.0 10e3/uL    RBC Count 3.82 3.80 - 5.20 10e6/uL    Hemoglobin 12.3 11.7 - 15.7 g/dL    Hematocrit 36.1 35.0 - 47.0 %    MCV 95 78 - 100 fL    MCH 32.2 26.5 - 33.0 pg    MCHC 34.1 31.5 - 36.5 g/dL    RDW 11.9 10.0 - 15.0 %    Platelet Count 325 150 - 450 10e3/uL   Comprehensive metabolic panel (BMP + Alb, Alk Phos, ALT, AST, Total. Bili, TP)    Collection Time: 01/05/22 12:30 PM   Result Value Ref Range    Sodium 140 136 - 145 mmol/L    Potassium 4.2 3.5 - 5.0 mmol/L    Chloride 107 98 - 107 mmol/L    Carbon Dioxide (CO2) 22 22 - 31 mmol/L    Anion Gap 11 5 - 18 mmol/L    Urea Nitrogen 8 8 - 22 mg/dL    Creatinine 1.03 0.60 - 1.10 mg/dL    Calcium 9.5 8.5 - 10.5 mg/dL    Glucose 85 70 - 125 mg/dL    Alkaline Phosphatase 62 45 - 120 U/L    AST 19 0 - 40 U/L    ALT 16 0 - 45 U/L    Protein Total 7.7 6.0 - 8.0 g/dL    Albumin 3.8 3.5 - 5.0 g/dL    Bilirubin Total 0.5 0.0 - 1.0 mg/dL    GFR Estimate 74 >60 mL/min/1.73m2   INR    Collection Time: 01/05/22 12:30 PM   Result Value Ref Range    INR 1.04 0.85 - 1.15   Partial thromboplastin time    Collection Time: 01/05/22 12:30 PM   Result Value Ref Range    aPTT 30 22 - 38 Seconds   HCG qualitative    Collection Time: 01/05/22 12:30 PM   Result Value Ref Range    hCG Serum Qualitative Negative Negative   HIV Antigen Antibody Combo    Collection Time: 01/05/22 12:30 PM   Result Value Ref Range    HIV Antigen Antibody Combo Negative Negative   CK total    Collection Time: 01/05/22 12:30 PM   Result Value Ref Range     30 - 190 U/L   UA with Microscopic reflex to Culture - Clinic Collect    Collection Time: 01/05/22  1:20 PM    Specimen: Urine, Midstream   Result Value Ref Range    Color Urine Yellow Colorless, Straw, Light Yellow, Yellow    Appearance Urine Clear Clear    Glucose Urine Negative Negative mg/dL    Bilirubin Urine Negative Negative    Ketones Urine Negative Negative mg/dL    Specific Gravity Urine 1.015 1.005 - 1.030    Blood Urine Large (A)  Negative    pH Urine 5.5 5.0 - 8.0    Protein Albumin Urine Negative Negative mg/dL    Urobilinogen Urine 0.2 0.2, 1.0 E.U./dL    Nitrite Urine Negative Negative    Leukocyte Esterase Urine Negative Negative   Nicotine and Cotinine Urine    Collection Time: 01/05/22  1:20 PM   Result Value Ref Range    Nicotine and Cotinine Urine Negative Sjupxk=141 ng/mL    Drug Screen Comment Comment    Urine Microscopic    Collection Time: 01/05/22  1:20 PM   Result Value Ref Range    Bacteria Urine Few (A) None Seen /HPF    RBC Urine 0-2 0-2 /HPF /HPF    WBC Urine 0-5 0-5 /HPF /HPF    Squamous Epithelials Urine Few (A) None Seen /LPF       No results found for this or any previous visit (from the past 24 hour(s)).     Diagnostics:  Labs pending at this time.  Results will be reviewed when available.   No EKG required for low risk surgery (cataract, skin procedure, breast biopsy, etc).  No EKG required, no history of coronary heart disease, significant arrhythmia, peripheral arterial disease or other structural heart disease.    CXR requested by surgical office.  Normal  Personally reviewed, today.    Revised Cardiac Risk Index (RCRI):  The patient has the following serious cardiovascular risks for perioperative complications:   - No serious cardiac risks = 0 points     RCRI Interpretation: 0 points: Class I (very low risk - 0.4% complication rate)    Items personally reviewed: vitals, labs, imaging and interpretation documented in the note.    Stephan Dotson MD  Signed Electronically by: Stephan Dotson MD  Copy of this evaluation report is provided to requesting physician.

## 2022-01-07 LAB
COTININE UR QL SCN: NEGATIVE NG/ML
DRUG SCREEN COMMENT: NORMAL

## 2022-01-10 ENCOUNTER — TELEPHONE (OUTPATIENT)
Dept: FAMILY MEDICINE | Facility: CLINIC | Age: 32
End: 2022-01-10
Payer: COMMERCIAL

## 2022-01-10 NOTE — TELEPHONE ENCOUNTER
----- Message from Stephan Dotson MD sent at 1/10/2022  3:33 PM CST -----  Please call to schedule covid vaccine and flu vaccine for patient.  (we missed this at her recent appointment)  I would recommend we do them before her travel for surgery.

## 2022-03-02 ENCOUNTER — OFFICE VISIT (OUTPATIENT)
Dept: FAMILY MEDICINE | Facility: CLINIC | Age: 32
End: 2022-03-02
Payer: COMMERCIAL

## 2022-03-02 VITALS
OXYGEN SATURATION: 98 % | TEMPERATURE: 98.2 F | HEART RATE: 76 BPM | DIASTOLIC BLOOD PRESSURE: 70 MMHG | BODY MASS INDEX: 36.41 KG/M2 | WEIGHT: 212.1 LBS | SYSTOLIC BLOOD PRESSURE: 108 MMHG

## 2022-03-02 DIAGNOSIS — S05.01XA ABRASION OF RIGHT CORNEA, INITIAL ENCOUNTER: Primary | ICD-10-CM

## 2022-03-02 PROCEDURE — 99214 OFFICE O/P EST MOD 30 MIN: CPT | Performed by: NURSE PRACTITIONER

## 2022-03-02 RX ORDER — ERYTHROMYCIN 5 MG/G
0.5 OINTMENT OPHTHALMIC 4 TIMES DAILY
Qty: 1 G | Refills: 1 | Status: SHIPPED | OUTPATIENT
Start: 2022-03-02 | End: 2024-04-14

## 2022-03-02 NOTE — PROGRESS NOTES
Assessment & Plan   1. Abrasion of right cornea, initial encounter  Four day history of erythema and pain without vision changes.  No foreign object visualized on exam.  Examined with fluorescein staining under black light and a corneal abrasion is present, approximately 3mm in length.  Will treat with topical erythromycin ointment and advised on timeframe for healing. Follow up if any changes in vision occur.    - erythromycin (ROMYCIN) 5 MG/GM ophthalmic ointment; Place 0.5 inches into the right eye 4 times daily  Dispense: 1 g; Refill: 1    Tia Rodriguez, CNP    Subjective     HPI:   Kitty Bueno is a 32 year old female who presents for right eye pain and redness.      She states she awoke four days ago with pain and redness of the right eye.  Watery drainage during the day, crusting in the morning.  She states it feels like there is something in her eye, right side.  She has looked and unable to visualize anything. No changes in vision.      Allergies:  has No Known Allergies.    SH/FH:  Social History and Family History reviewed and updated.   Tobacco Status:  She  reports that she has never smoked. She has never used smokeless tobacco.    Review of Systems:  A complete head to toe ROS is negative unless otherwise noted in HPI    Objective     Vitals:    03/02/22 1255   BP: 108/70   BP Location: Left arm   Patient Position: Sitting   Cuff Size: Adult Regular   Pulse: 76   Temp: 98.2  F (36.8  C)   TempSrc: Tympanic   SpO2: 98%   Weight: 96.2 kg (212 lb 1.6 oz)       Physical Exam:  GENERAL: Alert, well-appearing   EYES: Conjunctiva injected. Sclera erythematous. Watery drainage.  OWEN.  EOMs intact. Corneal light reflex bilaterally, red reflex present. Both eyelids inverted and no foreign object appreciated.  The eye was examined with fluorescein staining under black light and there is a 3mm longitudinal abrasion in the right lower corner.

## 2022-03-02 NOTE — LETTER
March 2, 2022      Kitty Bueno  [address] [place] [address]        To Whom It May Concern:    Kitty Bueno was seen in our clinic. She may return to work 3/2/22      Sincerely,        Tia Rodriguez DNP, FNP

## 2022-07-23 ENCOUNTER — HEALTH MAINTENANCE LETTER (OUTPATIENT)
Age: 32
End: 2022-07-23

## 2022-09-12 ENCOUNTER — PATIENT OUTREACH (OUTPATIENT)
Dept: FAMILY MEDICINE | Facility: CLINIC | Age: 32
End: 2022-09-12

## 2022-09-12 NOTE — LETTER
September 12, 2022      Kitty S Myles  [address] [place] [address]        Dear ,    This letter is to remind you that you are due for your follow-up Pap smear and Human Papillomavirus (HPV) test.    Please call [phone] to schedule your appointment at your earliest convenience.    If you have completed the appointment outside of the [facility] system, please have the records forwarded to our office. We will update your chart for your provider to review before your next annual wellness visit.     Thank you for choosing [facility]!      Sincerely,    Your [facility] Care Team

## 2022-10-01 ENCOUNTER — HEALTH MAINTENANCE LETTER (OUTPATIENT)
Age: 32
End: 2022-10-01

## 2022-11-04 NOTE — TELEPHONE ENCOUNTER
Luis Enrique Michaels,  Patient is lost to pap tracking follow-up. Attempts to contact pt have been made per reminder process and there has been no reply and/or no appt scheduled.  If you are wanting any additional contact attempts please send to your care team staff.       Pap Hx:  2/13/14 NIL pap  2/16/16 NIL pap, neg HPV  1/31/18 ASCUS, +HR HPV (not 16/18)  11/1/18 NIL pap  7/22/19 ASCUS, +HR HPV (not 16/18). Colposcopy recommended  8/17/20 LSIL, +HR HPV (not 16/18). Plan: colposcopy, due before 11/17/20  3/31/21 Lost to follow-up for pap tracking   9/27/21 ASCUS, neg HPV. Plan: cotest in 1 year per provider  10/5/21 Result letter sent  09/12/22 Reminder [place], Letter  10/6/22  Reminder call-lm  11/4/22 Lost to follow-up for pap tracking, fyi routed to provider

## 2023-05-09 ENCOUNTER — VIRTUAL VISIT (OUTPATIENT)
Dept: FAMILY MEDICINE | Facility: CLINIC | Age: 33
End: 2023-05-09
Payer: COMMERCIAL

## 2023-05-09 ENCOUNTER — LAB (OUTPATIENT)
Dept: LAB | Facility: CLINIC | Age: 33
End: 2023-05-09
Payer: COMMERCIAL

## 2023-05-09 DIAGNOSIS — R30.0 DYSURIA: ICD-10-CM

## 2023-05-09 DIAGNOSIS — R35.0 URINARY FREQUENCY: ICD-10-CM

## 2023-05-09 LAB
ALBUMIN UR-MCNC: NEGATIVE MG/DL
APPEARANCE UR: ABNORMAL
BACTERIA #/AREA URNS HPF: ABNORMAL /HPF
BILIRUB UR QL STRIP: NEGATIVE
CLUE CELLS: PRESENT
COLOR UR AUTO: YELLOW
GLUCOSE UR STRIP-MCNC: NEGATIVE MG/DL
HCG UR QL: NEGATIVE
HGB UR QL STRIP: NEGATIVE
KETONES UR STRIP-MCNC: NEGATIVE MG/DL
LEUKOCYTE ESTERASE UR QL STRIP: ABNORMAL
NITRATE UR QL: NEGATIVE
PH UR STRIP: 6 [PH] (ref 5–8)
RBC #/AREA URNS AUTO: ABNORMAL /HPF
SP GR UR STRIP: 1.01 (ref 1–1.03)
SQUAMOUS #/AREA URNS AUTO: ABNORMAL /LPF
TRICHOMONAS, WET PREP: ABNORMAL
UROBILINOGEN UR STRIP-ACNC: 0.2 E.U./DL
WBC #/AREA URNS AUTO: ABNORMAL /HPF
WBC CLUMPS #/AREA URNS HPF: PRESENT /HPF
WBC'S/HIGH POWER FIELD, WET PREP: ABNORMAL
YEAST, WET PREP: ABNORMAL

## 2023-05-09 PROCEDURE — 87186 SC STD MICRODIL/AGAR DIL: CPT

## 2023-05-09 PROCEDURE — 81001 URINALYSIS AUTO W/SCOPE: CPT

## 2023-05-09 PROCEDURE — 99213 OFFICE O/P EST LOW 20 MIN: CPT | Mod: VID | Performed by: NURSE PRACTITIONER

## 2023-05-09 PROCEDURE — 87210 SMEAR WET MOUNT SALINE/INK: CPT | Mod: VID | Performed by: NURSE PRACTITIONER

## 2023-05-09 PROCEDURE — 81025 URINE PREGNANCY TEST: CPT

## 2023-05-09 PROCEDURE — 87086 URINE CULTURE/COLONY COUNT: CPT

## 2023-05-09 NOTE — PROGRESS NOTES
Kitty is a 33 year old who is being evaluated via a billable video visit.      How would you like to obtain your AVS? MyChart  If the video visit is dropped, the invitation should be resent by: Text to cell phone: [phone]  Will anyone else be joining your video visit? No          Assessment & Plan     Dysuria  Patient presents with a 1 week history of painful urination, urinary frequency and slight odor at the end of urinary stream.  Denies any blood in urine or fever.  Does note some lower back and left-sided achy pain.  Reports normal, regular bowel movements.  Patient to schedule lab only appointment at home clinic for urinalysis, wet prep and urine pregnancy.  Will notify patient of results and further recommendations.  Encouraged to increase fluid intake in the interim.  - UA Macroscopic with reflex to Microscopic and Culture; Future  - Wet preparation  - HCG qualitative urine; Future    Urinary frequency  Urinary frequency associated with symptoms as above.  - UA Macroscopic with reflex to Microscopic and Culture; Future  - HCG qualitative urine; Future             See Patient Instructions    Tanna Springer DNP, APRN-CNP   [facility]    Subjective   Kitty is a 33 year old, presenting for the following health issues:  UTI    Roomed by: Julia             UTI    History of Present Illness       Reason for visit:  UTI  Symptom onset:  3-7 days ago  Symptoms include:  Back ache , painful urination , odor  Symptom intensity:  Mild  Symptom progression:  Staying the same  Had these symptoms before:  No  What makes it worse:  No  What makes it better:  No    She eats 0-1 servings of fruits and vegetables daily.She consumes 3 sweetened beverage(s) daily.She exercises with enough effort to increase her heart rate 10 to 19 minutes per day.  She exercises with enough effort to increase her heart rate 3 or less days per week.   She is taking medications regularly.       Genitourinary -  Female  Onset/Duration: 1 week  Description:   Painful urination (Dysuria): YES           Frequency: YES  Blood in urine (Hematuria): No  Delay in urine (Hesitency): No  Intensity: mild  Progression of Symptoms:  same  Accompanying Signs & Symptoms:  Fever/chills: No  Flank pain: YES - lower back and on left side. Achy   Nausea and vomiting: No  Vaginal symptoms: odor at the end of urine stream  Abdominal/Pelvic Pain: No  History:   History of frequent UTI s: No  History of kidney stones: No  Sexually Active: YES  Possibility of pregnancy: Don't Know  Precipitating or alleviating factors: None  Therapies tried and outcome: Increase fluid intake and  took Flagyl x 2 orally     Bowel movements pretty regular - not constipated   Taking Probiotic and boric acid       Review of Systems   Constitutional, HEENT, cardiovascular, pulmonary, gi and gu systems are negative, except as otherwise noted.      Objective    Vitals - Patient Reported  Weight (Patient Reported): 92.5 kg (204 lb)      Vitals:  No vitals were obtained today due to virtual visit.    Physical Exam   GENERAL: Healthy, alert and no distress  EYES: Eyes grossly normal to inspection.  No discharge or erythema, or obvious scleral/conjunctival abnormalities.  RESP: No audible wheeze, cough, or visible cyanosis.  No visible retractions or increased work of breathing.    SKIN: Visible skin clear. No significant rash, abnormal pigmentation or lesions.  NEURO: Cranial nerves grossly intact.  Mentation and speech appropriate for age.  PSYCH: Mentation appears normal, affect normal/bright, judgement and insight intact, normal speech and appearance well-groomed.    Diagnostic Test Results:  Pending            Video-Visit Details    Type of service:  Video Visit   Video Start Time: 10:54 AM  Video End Time:10:59 AM    Originating Location (pt. Location): Home    Distant Location (provider location):  Off-site  Platform used for Video Visit: "[employer]  documentation with Dragon Voice recognition Software. Although reviewed after completion, some words and grammatical errors may remain.

## 2023-05-09 NOTE — PATIENT INSTRUCTIONS
Your clinic record indicates that you are due for:   Pap and physical exam  Please schedule with your preferred clinic.     Dysuria  Patient presents with a 1 week history of painful urination, urinary frequency and slight odor at the end of urinary stream.  Denies any blood in urine or fever.  Does note some lower back and left-sided achy pain.  Reports normal, regular bowel movements.  Patient to schedule lab only appointment at home clinic for urinalysis, wet prep and urine pregnancy.  Will notify patient of results and further recommendations.  Encouraged to increase fluid intake in the interim.  - UA Macroscopic with reflex to Microscopic and Culture; Future  - Wet preparation  - HCG qualitative urine; Future    Urinary frequency  Urinary frequency associated with symptoms as above.  - UA Macroscopic with reflex to Microscopic and Culture; Future  - HCG qualitative urine; Future      Dysuria with Uncertain Cause (Adult)    The urethra is the tube that allows urine to pass out of the body. In a woman, the urethra is the opening above the vagina. In men, the urethra is the opening on the tip of the penis. Dysuria is the feeling of pain or burning in the urethra when passing urine.   Dysuria can be caused by anything that irritates or inflames the urethra. An infection or chemical irritation can cause this reaction. A bladder infection is the most common cause of dysuria in adults. A urine test can diagnose this. A bladder infection needs antibiotic treatment.   Soaps, lotions, colognes, and feminine hygiene products can cause dysuria. So can birth control jellies, creams, and foams. It will go away 1 to 3 days after discontinuing the use of these irritants.   Sexually transmitted infections (STIs), such as chlamydia or gonorrhea, can cause dysuria. Your healthcare provider may take a culture sample. Your provider may start you on antibiotic medicine before the culture test returns.   In women who have gone  through menopause, dysuria can be from dryness in the lining of the urethra. This can be treated with hormones. Dysuria becomes long-term (chronic) when it lasts for weeks or months. You may need to see a specialist (urologist) to diagnose and treat chronic dysuria.   Home care  These home care tips may help:  Don't use any chemicals or products that you think may be causing your symptoms.  If you were given a prescription medicine, take as directed. Take it until it's all used up.  If a culture was taken, don't have sex until you have been told that it is negative. A negative culture means you don't have an infection. Then follow your healthcare provider's advice to treat your condition.  If a culture was done and it is positive:   Both you and your sexual partner may need to be treated. This is true even if your partner has no symptoms.  Contact your healthcare provider or go to an urgent care clinic or the public health department to be looked at and treated.  Don't have sex until both you and your partner have finished all antibiotics and your healthcare provider says you are no longer contagious.  Learn about and use safe sex practices. The safest sex is with a partner who has tested negative and only has sex with you. Condoms can prevent STIs from spreading, but they aren't a guarantee.  Follow-up care  Follow up with your healthcare provider, or as advised. If a culture was taken, you may call as directed for the results. If you have an STI, follow up with your provider or the public health department for a complete STI screening, including HIV testing. For more information, contact CDC-INFO at 629-558-2551.   When to call your healthcare provider   Call your healthcare provider right away if any of these occur:  You aren't better after 3 days of treatment  Fever of 100.4 F (38 C) or higher, or as directed by your healthcare provider  Back or belly pain that gets worse  You can't urinate because of pain  New  discharge from the urethra, vagina, or penis  Painful sores on the penis  Rash or joint pain  Painful lumps (lymph nodes) in the groin  Testicle pain or swelling of the scrotum  [employer] last reviewed this educational content on 6/1/2022 2000-2022 [employer]. All rights reserved. This information is not intended as a substitute for professional medical care. Always follow your healthcare professional's instructions.

## 2023-05-10 DIAGNOSIS — N30.00 ACUTE CYSTITIS WITHOUT HEMATURIA: ICD-10-CM

## 2023-05-10 DIAGNOSIS — N76.0 BV (BACTERIAL VAGINOSIS): Primary | ICD-10-CM

## 2023-05-10 DIAGNOSIS — B96.89 BV (BACTERIAL VAGINOSIS): Primary | ICD-10-CM

## 2023-05-10 LAB — BACTERIA UR CULT: ABNORMAL

## 2023-05-10 RX ORDER — NITROFURANTOIN 25; 75 MG/1; MG/1
100 CAPSULE ORAL 2 TIMES DAILY
Qty: 10 CAPSULE | Refills: 0 | Status: SHIPPED | OUTPATIENT
Start: 2023-05-10 | End: 2023-05-15

## 2023-05-10 RX ORDER — METRONIDAZOLE 500 MG/1
500 TABLET ORAL 2 TIMES DAILY
Qty: 14 TABLET | Refills: 0 | Status: SHIPPED | OUTPATIENT
Start: 2023-05-10 | End: 2023-05-17

## 2023-06-09 ENCOUNTER — LAB REQUISITION (OUTPATIENT)
Dept: LAB | Facility: CLINIC | Age: 33
End: 2023-06-09

## 2023-06-09 ENCOUNTER — HOSPITAL ENCOUNTER (OUTPATIENT)
Facility: CLINIC | Age: 33
Discharge: HOME | End: 2023-06-09
Admitting: NURSE PRACTITIONER
Payer: COMMERCIAL

## 2023-06-09 DIAGNOSIS — Z31.9 ENCOUNTER FOR PROCREATIVE MANAGEMENT, UNSPECIFIED: ICD-10-CM

## 2023-06-09 LAB
ESTRADIOL SERPL-MCNC: 41 PG/ML
FSH SERPL IRP2-ACNC: 6.8 MIU/ML
HBA1C MFR BLD: 4.8 %
HOLD SPECIMEN: NORMAL
LH SERPL-ACNC: 7.5 MIU/ML
MIS SERPL-MCNC: 2.24 NG/ML (ref 0.58–8.1)
PROLACTIN SERPL 3RD IS-MCNC: 13 NG/ML (ref 5–23)
TSH SERPL DL<=0.005 MIU/L-ACNC: 1.66 UIU/ML (ref 0.3–4.2)

## 2023-06-09 PROCEDURE — 84146 ASSAY OF PROLACTIN: CPT | Performed by: NURSE PRACTITIONER

## 2023-06-09 PROCEDURE — 83002 ASSAY OF GONADOTROPIN (LH): CPT | Performed by: NURSE PRACTITIONER

## 2023-06-09 PROCEDURE — 83001 ASSAY OF GONADOTROPIN (FSH): CPT | Performed by: NURSE PRACTITIONER

## 2023-06-09 PROCEDURE — 83036 HEMOGLOBIN GLYCOSYLATED A1C: CPT | Performed by: NURSE PRACTITIONER

## 2023-06-09 PROCEDURE — 84443 ASSAY THYROID STIM HORMONE: CPT | Performed by: NURSE PRACTITIONER

## 2023-06-09 PROCEDURE — 83520 IMMUNOASSAY QUANT NOS NONAB: CPT | Performed by: NURSE PRACTITIONER

## 2023-06-09 PROCEDURE — 82670 ASSAY OF TOTAL ESTRADIOL: CPT | Performed by: NURSE PRACTITIONER

## 2023-06-20 ENCOUNTER — OFFICE VISIT (OUTPATIENT)
Dept: FAMILY MEDICINE | Facility: CLINIC | Age: 33
End: 2023-06-20
Payer: COMMERCIAL

## 2023-06-20 VITALS
HEART RATE: 106 BPM | RESPIRATION RATE: 18 BRPM | TEMPERATURE: 101 F | SYSTOLIC BLOOD PRESSURE: 126 MMHG | DIASTOLIC BLOOD PRESSURE: 76 MMHG | OXYGEN SATURATION: 98 %

## 2023-06-20 DIAGNOSIS — J02.0 ACUTE STREPTOCOCCAL PHARYNGITIS: Primary | ICD-10-CM

## 2023-06-20 DIAGNOSIS — R07.0 THROAT PAIN: ICD-10-CM

## 2023-06-20 DIAGNOSIS — R50.9 FEVER, UNSPECIFIED FEVER CAUSE: ICD-10-CM

## 2023-06-20 LAB
BASOPHILS # BLD AUTO: 0 10E3/UL (ref 0–0.2)
BASOPHILS NFR BLD AUTO: 0 %
DEPRECATED S PYO AG THROAT QL EIA: NEGATIVE
EOSINOPHIL # BLD AUTO: 0 10E3/UL (ref 0–0.7)
EOSINOPHIL NFR BLD AUTO: 0 %
ERYTHROCYTE [DISTWIDTH] IN BLOOD BY AUTOMATED COUNT: 12.8 % (ref 10–15)
GROUP A STREP BY PCR: DETECTED
HCT VFR BLD AUTO: 31.7 % (ref 35–47)
HGB BLD-MCNC: 10.7 G/DL (ref 11.7–15.7)
IMM GRANULOCYTES # BLD: 0 10E3/UL
IMM GRANULOCYTES NFR BLD: 0 %
LYMPHOCYTES # BLD AUTO: 1.1 10E3/UL (ref 0.8–5.3)
LYMPHOCYTES NFR BLD AUTO: 6 %
MCH RBC QN AUTO: 30.7 PG (ref 26.5–33)
MCHC RBC AUTO-ENTMCNC: 33.8 G/DL (ref 31.5–36.5)
MCV RBC AUTO: 91 FL (ref 78–100)
MONOCYTES # BLD AUTO: 1.8 10E3/UL (ref 0–1.3)
MONOCYTES NFR BLD AUTO: 10 %
MONOCYTES NFR BLD AUTO: NEGATIVE %
NEUTROPHILS # BLD AUTO: 15.1 10E3/UL (ref 1.6–8.3)
NEUTROPHILS NFR BLD AUTO: 84 %
PLATELET # BLD AUTO: 308 10E3/UL (ref 150–450)
RBC # BLD AUTO: 3.49 10E6/UL (ref 3.8–5.2)
SARS-COV-2 RNA RESP QL NAA+PROBE: NEGATIVE
WBC # BLD AUTO: 18.1 10E3/UL (ref 4–11)

## 2023-06-20 PROCEDURE — 85025 COMPLETE CBC W/AUTO DIFF WBC: CPT | Performed by: PHYSICIAN ASSISTANT

## 2023-06-20 PROCEDURE — 87635 SARS-COV-2 COVID-19 AMP PRB: CPT | Performed by: PHYSICIAN ASSISTANT

## 2023-06-20 PROCEDURE — 86665 EPSTEIN-BARR CAPSID VCA: CPT | Performed by: PHYSICIAN ASSISTANT

## 2023-06-20 PROCEDURE — 86308 HETEROPHILE ANTIBODY SCREEN: CPT | Performed by: PHYSICIAN ASSISTANT

## 2023-06-20 PROCEDURE — 36415 COLL VENOUS BLD VENIPUNCTURE: CPT | Performed by: PHYSICIAN ASSISTANT

## 2023-06-20 PROCEDURE — 87651 STREP A DNA AMP PROBE: CPT | Performed by: PHYSICIAN ASSISTANT

## 2023-06-20 PROCEDURE — 99214 OFFICE O/P EST MOD 30 MIN: CPT | Performed by: PHYSICIAN ASSISTANT

## 2023-06-20 PROCEDURE — 86665 EPSTEIN-BARR CAPSID VCA: CPT | Mod: 59 | Performed by: PHYSICIAN ASSISTANT

## 2023-06-20 RX ORDER — LIDOCAINE HYDROCHLORIDE 20 MG/ML
15 SOLUTION OROPHARYNGEAL
Qty: 100 ML | Refills: 0 | Status: SHIPPED | OUTPATIENT
Start: 2023-06-20 | End: 2023-06-22

## 2023-06-20 RX ORDER — PENICILLIN V POTASSIUM 500 MG/1
500 TABLET, FILM COATED ORAL 2 TIMES DAILY
Qty: 20 TABLET | Refills: 0 | Status: SHIPPED | OUTPATIENT
Start: 2023-06-20 | End: 2023-06-30

## 2023-06-20 ASSESSMENT — ENCOUNTER SYMPTOMS
SORE THROAT: 1
WHEEZING: 0
ABDOMINAL PAIN: 0
RHINORRHEA: 0
TROUBLE SWALLOWING: 1
FEVER: 1
SHORTNESS OF BREATH: 0
COUGH: 1
VOMITING: 0
CHILLS: 1

## 2023-06-20 NOTE — LETTER
June 20, 2023      Kitty Bueno  [address] [place] [address]        To Whom It May Concern:    Kitty Bueno  was seen on 06/20/23. Please excuse her symptoms have improved due to illness.        Sincerely,        Ila Hernandez PA-C

## 2023-06-20 NOTE — PATIENT INSTRUCTIONS
Strep (+)  Take antibiotics as indicate in the AVS  Throw away your old toothbrush after taking the antibiotics for 24 hours  Supportive care (rest, adequate fluid intake, avoidance of respiratory irritants, soft diet)   Take acetaminophen or ibuprofen as needed for pain control and fever  Penicillin is better taken on an empty stomach, one hour before meals or two hour after meals

## 2023-06-20 NOTE — LETTER
June 20, 2023      Kitty Bueno  [address] [place] [address]        To Whom It May Concern:    Kitty Bueno  was seen on 06/20/23. Please excuse her  until her symptoms have improved due to illness.        Sincerely,        Ila Hernandez PA-C     no

## 2023-06-21 LAB
EBV VCA IGG SER IA-ACNC: >750 U/ML
EBV VCA IGG SER IA-ACNC: POSITIVE
EBV VCA IGM SER IA-ACNC: <10 U/ML
EBV VCA IGM SER IA-ACNC: NORMAL

## 2023-08-06 ENCOUNTER — HEALTH MAINTENANCE LETTER (OUTPATIENT)
Age: 33
End: 2023-08-06

## 2023-08-11 ENCOUNTER — HOSPITAL ENCOUNTER (EMERGENCY)
Facility: CLINIC | Age: 33
Discharge: HOME | End: 2023-08-11
Attending: EMERGENCY MEDICINE | Admitting: EMERGENCY MEDICINE
Payer: COMMERCIAL

## 2023-08-11 ENCOUNTER — APPOINTMENT (OUTPATIENT)
Dept: RADIOLOGY | Facility: CLINIC | Age: 33
End: 2023-08-11
Attending: EMERGENCY MEDICINE
Payer: COMMERCIAL

## 2023-08-11 VITALS
HEIGHT: 64 IN | SYSTOLIC BLOOD PRESSURE: 116 MMHG | TEMPERATURE: 98.6 F | WEIGHT: 197 LBS | RESPIRATION RATE: 18 BRPM | HEART RATE: 100 BPM | OXYGEN SATURATION: 97 % | DIASTOLIC BLOOD PRESSURE: 57 MMHG | BODY MASS INDEX: 33.63 KG/M2

## 2023-08-11 DIAGNOSIS — S62.639A CLOSED AVULSION FRACTURE OF DISTAL PHALANX OF FINGER, INITIAL ENCOUNTER: ICD-10-CM

## 2023-08-11 DIAGNOSIS — S62.665A CLOSED NONDISPLACED FRACTURE OF DISTAL PHALANX OF LEFT RING FINGER, INITIAL ENCOUNTER: ICD-10-CM

## 2023-08-11 PROCEDURE — 99284 EMERGENCY DEPT VISIT MOD MDM: CPT

## 2023-08-11 PROCEDURE — 73130 X-RAY EXAM OF HAND: CPT | Mod: LT

## 2023-08-11 PROCEDURE — 26750 TREAT FINGER FRACTURE EACH: CPT | Mod: F3

## 2023-08-11 PROCEDURE — 250N000013 HC RX MED GY IP 250 OP 250 PS 637: Performed by: EMERGENCY MEDICINE

## 2023-08-11 RX ORDER — IBUPROFEN 600 MG/1
600 TABLET, FILM COATED ORAL ONCE
Status: COMPLETED | OUTPATIENT
Start: 2023-08-11 | End: 2023-08-11

## 2023-08-11 RX ADMIN — IBUPROFEN 600 MG: 600 TABLET, FILM COATED ORAL at 02:54

## 2023-08-11 ASSESSMENT — ACTIVITIES OF DAILY LIVING (ADL): ADLS_ACUITY_SCORE: 35
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
## 2023-11-02 ENCOUNTER — HOSPITAL ENCOUNTER (EMERGENCY)
Facility: CLINIC | Age: 33
Discharge: HOME | End: 2023-11-02
Payer: COMMERCIAL

## 2023-11-02 VITALS
HEART RATE: 76 BPM | SYSTOLIC BLOOD PRESSURE: 115 MMHG | RESPIRATION RATE: 12 BRPM | BODY MASS INDEX: 28 KG/M2 | WEIGHT: 164 LBS | TEMPERATURE: 97.4 F | DIASTOLIC BLOOD PRESSURE: 70 MMHG | OXYGEN SATURATION: 96 % | HEIGHT: 64 IN
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
## 2023-11-02 LAB — HCG UR QL: NEGATIVE

## 2023-11-02 PROCEDURE — 99283 EMERGENCY DEPT VISIT LOW MDM: CPT

## 2023-11-02 PROCEDURE — 81025 URINE PREGNANCY TEST: CPT

## 2023-11-02 RX ORDER — ACETAMINOPHEN 325 MG/1
650 TABLET ORAL ONCE
Status: COMPLETED | OUTPATIENT
Start: 2023-11-02 | End: 2023-11-02

## 2023-11-02 ASSESSMENT — ENCOUNTER SYMPTOMS
NECK PAIN: 1
BACK PAIN: 1
NAUSEA: 0
HEADACHES: 1
DIZZINESS: 0
SHORTNESS OF BREATH: 0
LIGHT-HEADEDNESS: 0
VOMITING: 0
MYALGIAS: 1
ABDOMINAL PAIN: 0

## 2023-11-02 ASSESSMENT — ACTIVITIES OF DAILY LIVING (ADL): ADLS_ACUITY_SCORE: 35

## 2023-11-02 NOTE — ED NOTES
Pt requested to leave the ED to get kids from school - will come back if needed. Pt signed AMA form. Pt understanding of risks and has C-collar in place.
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
## 2024-04-09 ENCOUNTER — OFFICE VISIT (OUTPATIENT)
Dept: FAMILY MEDICINE | Facility: CLINIC | Age: 34
End: 2024-04-09
Payer: COMMERCIAL

## 2024-04-09 VITALS
BODY MASS INDEX: 35.68 KG/M2 | HEART RATE: 71 BPM | RESPIRATION RATE: 18 BRPM | WEIGHT: 209 LBS | HEIGHT: 64 IN | OXYGEN SATURATION: 97 % | DIASTOLIC BLOOD PRESSURE: 79 MMHG | SYSTOLIC BLOOD PRESSURE: 121 MMHG | TEMPERATURE: 98.4 F

## 2024-04-09 DIAGNOSIS — Z12.4 CERVICAL CANCER SCREENING: ICD-10-CM

## 2024-04-09 DIAGNOSIS — M54.50 CHRONIC RIGHT-SIDED LOW BACK PAIN WITHOUT SCIATICA: ICD-10-CM

## 2024-04-09 DIAGNOSIS — R30.0 DYSURIA: Primary | ICD-10-CM

## 2024-04-09 DIAGNOSIS — G89.29 CHRONIC RIGHT-SIDED LOW BACK PAIN WITHOUT SCIATICA: ICD-10-CM

## 2024-04-09 DIAGNOSIS — N76.0 BV (BACTERIAL VAGINOSIS): ICD-10-CM

## 2024-04-09 DIAGNOSIS — R87.610 ASCUS WITH POSITIVE HIGH RISK HPV CERVICAL: ICD-10-CM

## 2024-04-09 DIAGNOSIS — R87.810 ASCUS WITH POSITIVE HIGH RISK HPV CERVICAL: ICD-10-CM

## 2024-04-09 DIAGNOSIS — B96.89 BV (BACTERIAL VAGINOSIS): ICD-10-CM

## 2024-04-09 LAB
ALBUMIN UR-MCNC: NEGATIVE MG/DL
APPEARANCE UR: CLEAR
BACTERIA #/AREA URNS HPF: ABNORMAL /HPF
BILIRUB UR QL STRIP: NEGATIVE
CLUE CELLS: PRESENT
COLOR UR AUTO: YELLOW
GLUCOSE UR STRIP-MCNC: NEGATIVE MG/DL
HGB UR QL STRIP: NEGATIVE
KETONES UR STRIP-MCNC: NEGATIVE MG/DL
LEUKOCYTE ESTERASE UR QL STRIP: NEGATIVE
NITRATE UR QL: NEGATIVE
PH UR STRIP: 7 [PH] (ref 5–8)
RBC #/AREA URNS AUTO: ABNORMAL /HPF
SP GR UR STRIP: 1.02 (ref 1–1.03)
SQUAMOUS #/AREA URNS AUTO: ABNORMAL /LPF
TRICHOMONAS, WET PREP: ABNORMAL
UROBILINOGEN UR STRIP-ACNC: 0.2 E.U./DL
WBC #/AREA URNS AUTO: ABNORMAL /HPF
WBC'S/HIGH POWER FIELD, WET PREP: ABNORMAL
YEAST, WET PREP: ABNORMAL

## 2024-04-09 PROCEDURE — 87591 N.GONORRHOEAE DNA AMP PROB: CPT | Performed by: FAMILY MEDICINE

## 2024-04-09 PROCEDURE — 87491 CHLMYD TRACH DNA AMP PROBE: CPT | Performed by: FAMILY MEDICINE

## 2024-04-09 PROCEDURE — G0145 SCR C/V CYTO,THINLAYER,RESCR: HCPCS | Performed by: FAMILY MEDICINE

## 2024-04-09 PROCEDURE — 87210 SMEAR WET MOUNT SALINE/INK: CPT | Performed by: FAMILY MEDICINE

## 2024-04-09 PROCEDURE — 87086 URINE CULTURE/COLONY COUNT: CPT | Performed by: FAMILY MEDICINE

## 2024-04-09 PROCEDURE — 81001 URINALYSIS AUTO W/SCOPE: CPT | Performed by: FAMILY MEDICINE

## 2024-04-09 PROCEDURE — 99213 OFFICE O/P EST LOW 20 MIN: CPT | Performed by: FAMILY MEDICINE

## 2024-04-09 PROCEDURE — 87624 HPV HI-RISK TYP POOLED RSLT: CPT | Performed by: FAMILY MEDICINE

## 2024-04-09 RX ORDER — METRONIDAZOLE 500 MG/1
500 TABLET ORAL 2 TIMES DAILY
Qty: 14 TABLET | Refills: 0 | Status: SHIPPED | OUTPATIENT
Start: 2024-04-09 | End: 2024-04-16
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
## 2024-04-10 LAB
C TRACH DNA SPEC QL PROBE+SIG AMP: NEGATIVE
N GONORRHOEA DNA SPEC QL NAA+PROBE: NEGATIVE

## 2024-04-11 LAB
BACTERIA UR CULT: NORMAL
BKR LAB AP GYN ADEQUACY: NORMAL
BKR LAB AP GYN INTERPRETATION: NORMAL
BKR LAB AP HPV REFLEX: NORMAL
BKR LAB AP LMP: NORMAL
BKR LAB AP PREVIOUS ABNORMAL: NORMAL
PATH REPORT.COMMENTS IMP SPEC: NORMAL
PATH REPORT.COMMENTS IMP SPEC: NORMAL
PATH REPORT.RELEVANT HX SPEC: NORMAL

## 2024-04-14 ASSESSMENT — ENCOUNTER SYMPTOMS
DYSURIA: 1
FEVER: 0
BACK PAIN: 1
ARTHRALGIAS: 0
SHORTNESS OF BREATH: 0
ABDOMINAL PAIN: 0
SORE THROAT: 0
COUGH: 0
CHILLS: 0
PALPITATIONS: 0
SEIZURES: 0
COLOR CHANGE: 0
HEMATURIA: 0
EYE PAIN: 0
VOMITING: 0

## 2024-04-17 ENCOUNTER — OFFICE VISIT (OUTPATIENT)
Dept: FAMILY MEDICINE | Facility: CLINIC | Age: 34
End: 2024-04-17
Payer: COMMERCIAL

## 2024-04-17 ENCOUNTER — PATIENT OUTREACH (OUTPATIENT)
Dept: FAMILY MEDICINE | Facility: CLINIC | Age: 34
End: 2024-04-17

## 2024-04-17 VITALS
HEART RATE: 78 BPM | WEIGHT: 210 LBS | DIASTOLIC BLOOD PRESSURE: 70 MMHG | TEMPERATURE: 97.3 F | OXYGEN SATURATION: 100 % | HEIGHT: 65 IN | BODY MASS INDEX: 34.99 KG/M2 | RESPIRATION RATE: 16 BRPM | SYSTOLIC BLOOD PRESSURE: 107 MMHG

## 2024-04-17 DIAGNOSIS — R87.610 ASCUS WITH POSITIVE HIGH RISK HPV CERVICAL: Primary | ICD-10-CM

## 2024-04-17 DIAGNOSIS — Z11.3 ROUTINE SCREENING FOR STI (SEXUALLY TRANSMITTED INFECTION): ICD-10-CM

## 2024-04-17 DIAGNOSIS — F39 MOOD DISORDER (H): Primary | ICD-10-CM

## 2024-04-17 DIAGNOSIS — R87.810 ASCUS WITH POSITIVE HIGH RISK HPV CERVICAL: Primary | ICD-10-CM

## 2024-04-17 LAB — T PALLIDUM AB SER QL: NONREACTIVE

## 2024-04-17 PROCEDURE — 99215 OFFICE O/P EST HI 40 MIN: CPT | Performed by: PHYSICIAN ASSISTANT

## 2024-04-17 PROCEDURE — 87389 HIV-1 AG W/HIV-1&-2 AB AG IA: CPT | Performed by: PHYSICIAN ASSISTANT

## 2024-04-17 PROCEDURE — 96127 BRIEF EMOTIONAL/BEHAV ASSMT: CPT | Performed by: PHYSICIAN ASSISTANT

## 2024-04-17 PROCEDURE — 86780 TREPONEMA PALLIDUM: CPT | Performed by: PHYSICIAN ASSISTANT

## 2024-04-17 PROCEDURE — 36415 COLL VENOUS BLD VENIPUNCTURE: CPT | Performed by: PHYSICIAN ASSISTANT

## 2024-04-17 RX ORDER — TRAZODONE HYDROCHLORIDE 50 MG/1
25-50 TABLET, FILM COATED ORAL AT BEDTIME
Qty: 30 TABLET | Refills: 1 | Status: SHIPPED | OUTPATIENT
Start: 2024-04-17

## 2024-04-17 RX ORDER — ESCITALOPRAM OXALATE 5 MG/1
TABLET ORAL
Qty: 60 TABLET | Refills: 1 | Status: SHIPPED | OUTPATIENT
Start: 2024-04-17

## 2024-04-17 SDOH — HEALTH STABILITY: PHYSICAL HEALTH: ON AVERAGE, HOW MANY DAYS PER WEEK DO YOU ENGAGE IN MODERATE TO STRENUOUS EXERCISE (LIKE A BRISK WALK)?: 0 DAYS

## 2024-04-17 ASSESSMENT — SOCIAL DETERMINANTS OF HEALTH (SDOH): HOW OFTEN DO YOU GET TOGETHER WITH FRIENDS OR RELATIVES?: ONCE A WEEK

## 2024-04-17 ASSESSMENT — PATIENT HEALTH QUESTIONNAIRE - PHQ9
SUM OF ALL RESPONSES TO PHQ QUESTIONS 1-9: 11
10. IF YOU CHECKED OFF ANY PROBLEMS, HOW DIFFICULT HAVE THESE PROBLEMS MADE IT FOR YOU TO DO YOUR WORK, TAKE CARE OF THINGS AT HOME, OR GET ALONG WITH OTHER PEOPLE: SOMEWHAT DIFFICULT
SUM OF ALL RESPONSES TO PHQ QUESTIONS 1-9: 11

## 2024-04-17 NOTE — COMMUNITY RESOURCES LIST (ENGLISH)
April 17, 2024           YOUR PERSONALIZED LIST OF SERVICES & PROGRAMS           & SHELTER    Case Management      [facility] - Housing search assistance  [address] [place] [address] (Distance: 0.7 miles)  Phone: [phone]  Language: English, Samoan, Azeri, Hmong  Fee: Free  Accessibility: Ada accessible, Blind accommodation, Deaf or hard of hearing, Translation services      [facility] - [place] Access  [address] [place] [address] (Distance: 0.5 miles)  Phone: [phone]  Language: English  Fee: Free  Accessibility: Translation services, Ada accessible      [employer] - Housing Stabilization Services  Phone: [phone]  Website: https://homebasemn.com/  Language: English  Hours: Mon 8:00 AM - 4:00 PM Tue 8:00 AM - 4:00 PM Wed 8:00 AM - 4:00 PM Thu 8:00 AM - 4:00 PM Fri 8:00 AM - 4:00 PM  Fee: Free  Accessibility: Blind accommodation, Deaf or hard of hearing  Transportation Options: Free transportation    Payment Assistance      [facility] [place] deposit assistance  [address] [place] [address] (Distance: 3.5 miles)  Phone: [phone]  Language: English, Mozambican, Citizen of [place], Hmong  Fee: Free  Accessibility: Ada accessible, Translation services      [place] - [place] - Security Deposit Assistance  [address] [place] [address] (Distance: 3.5 miles)  Phone: [phone]  Language: English  Fee: Free      [employer] - [employer] the [employer]  Phone: [phone]  Website: https://www.rgo77-muogawcssitxsv.org/programs.html  Language: English  Hours: Mon 7:00 AM - 7:00 PM Tue 7:00 AM - 7:00 PM Wed 7:00 AM - 7:00 PM Thu 7:00 AM - 7:00 PM Fri 7:00 AM - 7:00 PM  Fee: Free    Mediation & Eviction Prevention      [facility] [facility] - Mortgage Foreclosure Prevention  [address] [place] [address] (Distance: 1.0 miles)  Phone: [phone]  Language: English  Fee: Free  Accessibility: Ada  accessible      [employer] - Foreclosure Prevention  [address] [place] [address] (Distance: 1.8 miles)  Phone: [phone]  Website: http://www.[facility].org  Language: English, Scottish, Hmong  Fee: Free, Sliding scale  Accessibility: Blind accommodation, Ada accessible      [facility] - Tenant Rights / Eviction Prevention  Website: https://[facility].org/p-aecy-ng-/  Language: English, Scottish        & RECREATION    [facility] [employer] [place] - Sports clubs and recreational activities - [facility] - [facility]  [address] [place] [address] (Distance: 0.7 miles)  Language: English  Fee: Self pay, Sliding scale      [facility]  [address] [place] [address] (Distance: 1.1 miles)  Phone: [phone]  Website: https://[facility] Hospital[facility]imonetikProperty Partner/seniors/  Language: English, Scottish  Fee: Free, Self pay, Sliding scale  Accessibility: Translation services      [facility] - Adult Enrichment  Phone: [phone]  Website: https://Kudoala/adults-seniors/adult-enrichment/  Language: English  Hours: Mon 7:30 AM - 4:00 PM Tue 7:30 AM - 4:00 PM Wed 7:30 AM - 4:00 PM Thu 7:30 AM - 4:00 PM Fri 7:30 AM - 4:00 PM    Classes/Groups      [facility] - Personal Training  [address] [place] [address] (Distance: 0.7 miles)  Phone: [phone]  Website: https://www.canorth.org/locations/[facility]/health__fitness/personal_training  Language: English  Fee: Self pay      [facility] - Personal training  [address] [place] [address] (Distance: 18.2 miles)  Phone: [phone]  Website: https://www.[employer]/  Language: English, Scottish  Fee: Sliding scale, Self pay, Insurance      [facility] - Adult Enrichment  Phone: [phone]  Website: https://Kudoala/adults-seniors/adult-enrichment/  Language: English  Hours: Mon 7:30 AM  - 4:00 PM Tue 7:30 AM - 4:00 PM Wed 7:30 AM - 4:00 PM Thu 7:30 AM - 4:00 PM Fri 7:30 AM - 4:00 PM               IMPORTANT NUMBERS & WEBSITES        Emergency Services  911  .   [facility]  211 http://211unitedway.org  .   Poison Control  (474) 165-7431 http://[place]poison.org http://wisconsinpoison.org  .     Suicide and Crisis Lifeline  988 http://988lifeline.org  .   [facility]help [place] Child Abuse Hotline  200.168.8654 http://Childhelphotline.org   .   National Sexual Assault Hotline  (792) 335-9691 (HOPE) http://We Clustern.org   .     National Runaway Safeline  (563) 437-7289 (RUNAWAY) http://Clear Standards[employer]ystems International.[employer]  .   Pregnancy & Postpartum Support  Call/text 898-941-4763  [place]: http://ppsupportmn.org  [place]: http://psichapters.com/wi  .   Substance Abuse National Helpline ([facility])  911-360-HELP (8037) http://Findtreatment.gov   .                DISCLAIMER: These resources have been generated via the [employer]. [employer] does not endorse any service providers mentioned in this resource list. [employer] does not guarantee that the services mentioned in this resource list will be available to you or will improve your health or wellness.    [facility]

## 2024-04-17 NOTE — PROGRESS NOTES
Preventive Care Visit  [facility]  Ana Dwyer PA-C, Family Medicine  Apr 17, 2024  {Provider  Link to [facility] :093875}    {PROVIDER CHARTING PREFERENCE:204417}    Yoni Tang is a 34 year old, presenting for the following:  Physical (Pt is here for physical and has some things to discuss)        4/17/2024     9:54 AM   Additional Questions   Roomed by Claudette   Accompanied by self        Health Care Directive  Patient does not have a Health Care Directive or Living Will: {ADVANCE_DIRECTIVE_STATUS:960781}    HPI  ***  {MA/LPN/RN Pre-Provider Visit Orders- hCG/UA/Strep (Optional):983521}  {SUPERLIST (Optional):676739}  {additonal problems for provider to add (Optional):654325}      4/17/2024   General Health   How would you rate your overall physical health? (!) FAIR   Feel stress (tense, anxious, or unable to sleep) Very much   (!) STRESS CONCERN      4/17/2024   Nutrition   Three or more servings of calcium each day? (!) NO   Diet: Regular (no restrictions)   How many servings of fruit and vegetables per day? (!) 0-1   How many sweetened beverages each day? (!) 2         4/17/2024   Exercise   Days per week of moderate/strenous exercise 0 days   (!) EXERCISE CONCERN      4/17/2024   Social Factors   Frequency of gathering with friends or relatives Once a week   Worry food won't last until get money to buy more No   Food not last or not have enough money for food? No   Do you have housing?  Yes   Are you worried about losing your housing? Yes   Lack of transportation? No   Unable to get utilities (heat,electricity)? No   Want help with housing or utility concern? No   (!) HOUSING CONCERN PRESENT      4/17/2024   Dental   Dentist two times every year? (!) NO         4/17/2024   TB Screening   Were you born outside of the [place]? No       Today's PHQ-9 Score:       4/17/2024     9:54 AM   PHQ-9 SCORE   PHQ-9 Total Score MyChart 11 (Moderate depression)   PHQ-9 Total Score 11          4/17/2024   Substance Use   Alcohol more than 3/day or more than 7/wk Yes   How often do you have a drink containing alcohol 4 or more times a week   How many alcohol drinks on typical day 5 or 6   How often do you have 5+ drinks at one occasion Weekly   Audit 2/3 Score 5   How often not able to stop drinking once started Monthly   How often failed to do what normally expected Monthly   How often needed first drink in am after a heavy drinking session Monthly   How often feeling of guilt or remorse after drinking Monthly   How often unable to remember what happened the night before Less than monthly   Have you or someone else been injured because of your drinking No   Has anyone been concerned or suggested you cut down on drinking No   TOTAL SCORE - AUDIT 18   Do you use any other substances recreationally? (!) PRESCRIPTION DRUGS     Social History     Tobacco Use    Smoking status: Some Days     Types: Cigarettes, Other     Passive exposure: Never    Smokeless tobacco: Never    Tobacco comments:     no exposure   Vaping Use    Vaping status: Never Used   Substance Use Topics    Alcohol use: No     Comment: Alcoholic Drinks/day: rarely    Drug use: No     {Provider  If there are gaps in the social history shown above, please follow the link to update and then refresh the note Link to Social and Substance History :430505}        4/17/2024   Breast Cancer Screening   Family history of breast, colon, or ovarian cancer? No / Unknown      {Mammogram Decision Support (Optional):065749}        4/17/2024   STI Screening   New sexual partner(s) since last STI/HIV test? (!) YES ***     History of abnormal Pap smear: { :666064}        Latest Ref Rng & Units 4/9/2024     3:07 PM 9/27/2021    12:22 PM 8/17/2020    11:28 AM   PAP / HPV   PAP  Negative for Intraepithelial Lesion or Malignancy (NILM)  Atypical squamous cells of undetermined significance (ASC-US)  LSIL encompassing HPV/mild dysplasia/CIN1  Electronically  "signed by Harriet Cardenas MD on 8/31/2020 at  9:23 AM      HPV 16 DNA Negative Negative  Negative  Negative    HPV 18 DNA Negative Negative  Negative  Negative    Other HR HPV Negative Negative  Negative  Positive            4/17/2024   Contraception/Family Planning   Questions about contraception or family planning (!) YES ***     {Provider  Use the storyboard to review patient history, after sections have been marked as reviewed, refresh note to capture documentation:199877}   Reviewed and updated as needed this visit by Provider                    {HISTORY OPTIONS (Optional):483904}    {ROS Picklists (Optional):618431}     Objective    Exam  /70 (BP Location: Right arm, Patient Position: Sitting, Cuff Size: Adult Regular)   Pulse 78   Temp 97.3  F (36.3  C) (Temporal)   Resp 16   Ht 1.64 m (5' 4.57\")   Wt 95.3 kg (210 lb)   LMP 03/28/2024 (Exact Date)   SpO2 100%   BMI 35.42 kg/m     Estimated body mass index is 35.42 kg/m  as calculated from the following:    Height as of this encounter: 1.64 m (5' 4.57\").    Weight as of this encounter: 95.3 kg (210 lb).    Physical Exam  {Exam Choices (Optional):222344}        Signed Electronically by: Ana Dwyer PA-C  Prior to immunization administration, verified patients identity using patient s name and date of birth. Please see Immunization Activity for additional information.     Screening Questionnaire for Adult Immunization    Are you sick today?   No   Do you have allergies to medications, food, a vaccine component or latex?   No   Have you ever had a serious reaction after receiving a vaccination?   No   Do you have a long-term health problem with heart, lung, kidney, or metabolic disease (e.g., diabetes), asthma, a blood disorder, no spleen, complement component deficiency, a cochlear implant, or a spinal fluid leak?  Are you on long-term aspirin therapy?   No   Do you have cancer, leukemia, HIV/AIDS, or any other immune system problem? "   No   Do you have a parent, brother, or sister with an immune system problem?   No   In the past 3 months, have you taken medications that affect  your immune system, such as prednisone, other steroids, or anticancer drugs; drugs for the treatment of rheumatoid arthritis, Crohn s disease, or psoriasis; or have you had radiation treatments?   No   Have you had a seizure, or a brain or other nervous system problem?   No   During the past year, have you received a transfusion of blood or blood    products, or been given immune (gamma) globulin or antiviral drug?   No   For women: Are you pregnant or is there a chance you could become       pregnant during the next month?   No   Have you received any vaccinations in the past 4 weeks?   No     Immunization questionnaire answers were all negative.      Patient instructed to remain in clinic for 15 minutes afterwards, and to report any adverse reactions.     Screening performed by Claudette Jones MA on 4/17/2024 at 9:58 AM.   {Email feedback regarding this note to [email or web address]   :113264}  .undefined[^^

## 2024-04-18 LAB — HIV 1+2 AB+HIV1 P24 AG SERPL QL IA: NONREACTIVE

## 2024-04-22 NOTE — PROGRESS NOTES
"  Assessment & Plan     Mood disorder (H24)  -start Lexapro 5-->10mg.  Side effects of medication(s) discussed as well as risks/benefits of taking    -Trazodone for sleep to use PRN.  Side effects of medication(s) discussed as well as risks/benefits of taking    -mental health referral placed  -plan to follow-up in 1 month sooner with concerns.    - Adult Mental Health  Referral; Future  - escitalopram (LEXAPRO) 5 MG tablet; Take one tablet once daily for 7 days, then increase to 2 tablets (10mg) once daily  - traZODone (DESYREL) 50 MG tablet; Take 0.5-1 tablets (25-50 mg) by mouth at bedtime    Routine screening for STI (sexually transmitted infection)  - HIV Antigen Antibody Combo; Future  - Treponema Abs w Reflex to RPR and Titer; Future  - HIV Antigen Antibody Combo  - Treponema Abs w Reflex to RPR and Titer    42 minutes spent by me on the date of the encounter doing chart review, patient visit, and documentation     Nicotine/Tobacco Cessation  She reports that she has been smoking cigarettes and other. She has never been exposed to tobacco smoke. She has never used smokeless tobacco.  Nicotine/Tobacco Cessation Plan        BMI  Estimated body mass index is 35.42 kg/m  as calculated from the following:    Height as of this encounter: 1.64 m (5' 4.57\").    Weight as of this encounter: 95.3 kg (210 lb).       Depression Screening Follow Up        4/17/2024     9:54 AM   PHQ   PHQ-9 Total Score 11   Q9: Thoughts of better off dead/self-harm past 2 weeks Not at all           Follow Up Actions Taken  Crisis resource information provided in After Visit Summary  Mental Health Referral placed     Counseling  Appropriate preventive services were discussed with this patient, including applicable screening as appropriate for fall prevention, nutrition, physical activity, Tobacco-use cessation, weight loss and cognition.  Checklist reviewing preventive services available has been given to the patient.  Reviewed " "patient's diet, addressing concerns and/or questions.   The patient was instructed to see the dentist every 6 months.   The patient reports drinking more than one alcoholic drink per day and sometimes engages in binge or excessive drinking. The patient was counseled and given information about possible harmful effects of excessive alcohol intake as well as where to get help for alcohol problems. The patient's PHQ-9 score is consistent with moderate depression. She was provided with information regarding depression.         Yoni Tang is a 34 year old, presenting for the following health issues:  Physical (Pt is here for physical and has some things to discuss)        4/17/2024     9:54 AM   Additional Questions   Roomed by Claudette   Accompanied by self     HPI     -has been feeling overwhelmed lately.  Has 4 kids, one with significant developmental delay and two others with difficulty at school.    -has noticed that she has been isolating in her room, does not feel like doing much and crying more than she would like  -no SI/HI but has felt like people may be better off without her around.    -not sleeping well      Objective    /70 (BP Location: Right arm, Patient Position: Sitting, Cuff Size: Adult Regular)   Pulse 78   Temp 97.3  F (36.3  C) (Temporal)   Resp 16   Ht 1.64 m (5' 4.57\")   Wt 95.3 kg (210 lb)   LMP 03/28/2024 (Exact Date)   SpO2 100%   BMI 35.42 kg/m    Body mass index is 35.42 kg/m .  Physical Exam   GENERAL: alert and no distress  NECK: no adenopathy, no asymmetry, masses, or scars  RESP: lungs clear to auscultation - no rales, rhonchi or wheezes  CV: regular rate and rhythm, normal S1 S2, no S3 or S4, no murmur, click or rub, no peripheral edema  PSYCH: mentation appears normal, affect normal/bright            Signed Electronically by: Ana Dwyer PA-C    Answers submitted by the patient for this visit:  Patient Health Questionnaire (Submitted on 4/17/2024)  If you " checked off any problems, how difficult have these problems made it for you to do your work, take care of things at home, or get along with other people?: Somewhat difficult  PHQ9 TOTAL SCORE: 11

## 2024-06-13 NOTE — PROGRESS NOTES
Assessment & Plan      1. Acute streptococcal pharyngitis    -Strep PCR (+)  - penicillin V (VEETID) 500 MG tablet; Take 1 tablet (500 mg) by mouth 2 times daily for 10 days  Dispense: 20 tablet; Refill: 0    2. Fever, unspecified fever cause    -CBC shows leukocytosis  - CBC with platelets and differential  - EBV Capsid Antibody IgM  - EBV Capsid Antibody IgG    3. Throat pain    -Strep PCR (+)  -COVID-19 (-)  - Mono (-)    - Streptococcus A Rapid Screen w/Reflex to PCR - Clinic Collect  - Symptomatic COVID-19 Virus (Coronavirus) by PCR Nose  - Group A Streptococcus PCR Throat Swab  - Mononucleosis screen  - EBV Capsid Antibody IgM  - EBV Capsid Antibody IgG  - lidocaine, viscous, (XYLOCAINE) 2 % solution; Swish and spit 15 mLs in mouth every 3 hours as needed for pain ; Max 8 doses/24 hour period.  Dispense: 100 mL; Refill: 0    Results for orders placed or performed in visit on 06/20/23   Symptomatic COVID-19 Virus (Coronavirus) by PCR Nose     Status: Normal    Specimen: Nose; Swab   Result Value Ref Range    SARS CoV2 PCR Negative Negative    Narrative    Testing was performed using the Xpert Xpress SARS-CoV-2 Assay on the Cepheid Gene-Xpert Instrument Systems. Additional information about this Emergency Use Authorization (EUA) assay can be found via the Lab Guide. This test should be ordered for the detection of SARS-CoV-2 in individuals who meet SARS-CoV-2 clinical and/or epidemiological criteria as well as from individuals without symptoms or other reasons to suspect COVID-19. Test performance for asymptomatic patients has only been established in anterior nasal swab specimens. This test is for in vitro diagnostic use under the FDA EUA for laboratories certified under CLIA to perform high complexity testing. This test has not been FDA cleared or approved. A negative result does not rule out the presence of PCR inhibitors in the specimen or target RNA concentration below the limit of detection for the assay.  Dr. Oconnor reviewed message and that naproxen may have affected those labs.    Nothing further to do for now, will await pelvic US results.    Patient in agreement with plan.    The possibility of a false negative should be considered if the patient's recent exposure or clinical presentation suggests COVID-19. This test was validated by [facility]. These Laboratories are certified under the Clinical Laboratory Improvement Amendments (CLIA) as qualified to perform high complexity testing.     Mononucleosis screen     Status: Normal   Result Value Ref Range    Mononucleosis Screen Negative Negative   CBC with platelets and differential     Status: Abnormal   Result Value Ref Range    WBC Count 18.1 (H) 4.0 - 11.0 10e3/uL    RBC Count 3.49 (L) 3.80 - 5.20 10e6/uL    Hemoglobin 10.7 (L) 11.7 - 15.7 g/dL    Hematocrit 31.7 (L) 35.0 - 47.0 %    MCV 91 78 - 100 fL    MCH 30.7 26.5 - 33.0 pg    MCHC 33.8 31.5 - 36.5 g/dL    RDW 12.8 10.0 - 15.0 %    Platelet Count 308 150 - 450 10e3/uL    % Neutrophils 84 %    % Lymphocytes 6 %    % Monocytes 10 %    % Eosinophils 0 %    % Basophils 0 %    % Immature Granulocytes 0 %    Absolute Neutrophils 15.1 (H) 1.6 - 8.3 10e3/uL    Absolute Lymphocytes 1.1 0.8 - 5.3 10e3/uL    Absolute Monocytes 1.8 (H) 0.0 - 1.3 10e3/uL    Absolute Eosinophils 0.0 0.0 - 0.7 10e3/uL    Absolute Basophils 0.0 0.0 - 0.2 10e3/uL    Absolute Immature Granulocytes 0.0 <=0.4 10e3/uL   Streptococcus A Rapid Screen w/Reflex to PCR - Clinic Collect     Status: Normal    Specimen: Throat; Swab   Result Value Ref Range    Group A Strep antigen Negative Negative   Group A Streptococcus PCR Throat Swab     Status: Abnormal    Specimen: Throat; Swab   Result Value Ref Range    Group A strep by PCR Detected (A) Not Detected    Narrative    The Xpert Xpress Strep A test, performed on the [employer] Systems, is a rapid, qualitative in vitro diagnostic test for the detection of Streptococcus pyogenes (Group A ß-hemolytic Streptococcus, Strep A) in throat swab specimens from patients with signs and symptoms of pharyngitis. The Xpert Xpress Strep A test can be used  as an aid in the diagnosis of Group A Streptococcal pharyngitis. The assay is not intended to monitor treatment for Group A Streptococcus infections. The Xpert Xpress Strep A test utilizes an automated real-time polymerase chain reaction (PCR) to detect Streptococcus pyogenes DNA.   CBC with platelets and differential     Status: Abnormal    Narrative    The following orders were created for panel order CBC with platelets and differential.  Procedure                               Abnormality         Status                     ---------                               -----------         ------                     CBC with platelets and d...[[id number]]  Abnormal            Final result                 Please view results for these tests on the individual orders.         Patient Instructions   Strep (+)  Take antibiotics as indicate in the AVS  Throw away your old toothbrush after taking the antibiotics for 24 hours  Supportive care (rest, adequate fluid intake, avoidance of respiratory irritants, soft diet)   Take acetaminophen or ibuprofen as needed for pain control and fever  Penicillin is better taken on an empty stomach, one hour before meals or two hour after meals             Return if symptoms worsen or fail to improve, for Follow up.    At the end of the encounter, I discussed results, diagnosis, medications. Discussed red flags for immediate return to clinic/ER, as well as indications for follow up if no improvement. Patient understood and agreed to plan. Patient was stable for discharge.    Yoni Tang is a 33 year old female who presents to clinic today for the following health issues:  Chief Complaint   Patient presents with     Throat Pain     Since yesterday      Cough     Fever     HPI    Patient reports fever, sore throat, cough x one day ago. Temp was 101 F today in the clinic. She has not tried any treatment. Patient denies congestion, runny nose, shortness of breath, wheezing.     Review of  Systems   Constitutional: Positive for chills and fever.   HENT: Positive for sore throat and trouble swallowing. Negative for congestion and rhinorrhea.    Respiratory: Positive for cough. Negative for shortness of breath and wheezing.    Gastrointestinal: Negative for abdominal pain and vomiting.   All other systems reviewed and are negative.      Problem List:  2020-10: Symptomatic anemia  2020-10: Anemia requiring transfusions  2020-10: AGUILAR (dyspnea on exertion)  2020: ASCUS with positive high risk HPV cervical  2019: 39 weeks gestation of pregnancy  2019: Anemia during pregnancy in third trimester  2019: Encounter for supervision of normal pregnancy in third   trimester  2018: Family history of genetic disorder  2015: Pregnant  2015: Pregnancy complicated by female genital mutilation  2015: Previous  delivery affecting pregnancy  2015: Genital herpes  2015: Anemia  2015: Anxiety  2014-10: Previous  delivery, antepartum condition or   complication  2014: Abnormal Pap  2014: Cervical cancer screening  2010: Nondependent cannabis abuse  2010: Sickle-cell trait (H)      Past Medical History:   Diagnosis Date     Anemia requiring transfusions 10/5/2020     ASCUS with positive high risk HPV cervical 2019     Asthma      Chlamydia      Family history of genetic disorder 2018    Overview:  Refer to [facility] for genetic counseling [age] year 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) Other delays include speech and movement Routine counseling by genetic counselor, no particular genetic test for  eye condition as can have multiple cause Rufus tested for sickle cell, per Rufus, does not have sickle cell  trait/carrier      History of transfusion 2014    When her IUD came out     Mental disorder     Anxiety     Pregnancy complicated by female genital mutilation 2015     Symptomatic anemia 10/5/2020     Urinary tract infection        Social  History     Tobacco Use     Smoking status: Never     Passive exposure: Never     Smokeless tobacco: Never     Tobacco comments:     no exposure   Vaping Use     Vaping status: Never Used   Substance Use Topics     Alcohol use: No     Comment: Alcoholic Drinks/day: rarely           Objective    /76   Pulse 106   Temp (!) 101  F (38.3  C)   Resp 18   LMP 04/15/2023 (Exact Date)   SpO2 98%   Physical Exam  Constitutional:       Appearance: She is well-developed. She is ill-appearing.   HENT:      Head: Normocephalic.      Right Ear: Tympanic membrane normal.      Left Ear: Tympanic membrane normal.      Mouth/Throat:      Mouth: Mucous membranes are moist.      Pharynx: Uvula midline. Posterior oropharyngeal erythema present.      Tonsils: Tonsillar exudate present. 3+ on the right. 3+ on the left.   Cardiovascular:      Rate and Rhythm: Regular rhythm. Tachycardia present.   Pulmonary:      Effort: Pulmonary effort is normal.      Breath sounds: Normal breath sounds.   Lymphadenopathy:      Head:      Right side of head: No submental, submandibular or tonsillar adenopathy.      Left side of head: No submental, submandibular or tonsillar adenopathy.      Cervical: Cervical adenopathy present.      Right cervical: Superficial cervical adenopathy present.      Left cervical: Superficial cervical adenopathy present.   Skin:     General: Skin is warm and dry.      Findings: No rash.   Neurological:      Mental Status: She is lethargic.   Psychiatric:         Mood and Affect: Mood normal.         Behavior: Behavior normal.              Ila Hernandez PA-C

## 2024-09-29 ENCOUNTER — HEALTH MAINTENANCE LETTER (OUTPATIENT)
Age: 34
End: 2024-09-29

## 2024-11-06 ENCOUNTER — LAB (OUTPATIENT)
Dept: LAB | Facility: CLINIC | Age: 34
End: 2024-11-06
Payer: COMMERCIAL

## 2024-11-06 ENCOUNTER — TRANSCRIBE ORDERS (OUTPATIENT)
Dept: LAB | Facility: CLINIC | Age: 34
End: 2024-11-06

## 2024-11-06 DIAGNOSIS — Z01.812 ENCOUNTER FOR PREPROCEDURAL LABORATORY EXAMINATION: Primary | ICD-10-CM

## 2024-11-06 DIAGNOSIS — Z01.812 ENCOUNTER FOR PREPROCEDURAL LABORATORY EXAMINATION: ICD-10-CM

## 2024-11-06 LAB
ALBUMIN SERPL BCG-MCNC: 3.9 G/DL (ref 3.5–5.2)
ALBUMIN UR-MCNC: NEGATIVE MG/DL
ALP SERPL-CCNC: 57 U/L (ref 40–150)
ALT SERPL W P-5'-P-CCNC: 15 U/L (ref 0–50)
ANION GAP SERPL CALCULATED.3IONS-SCNC: 9 MMOL/L (ref 7–15)
APPEARANCE UR: CLEAR
APTT PPP: 29 SECONDS (ref 22–38)
AST SERPL W P-5'-P-CCNC: 18 U/L (ref 0–45)
BACTERIA #/AREA URNS HPF: ABNORMAL /HPF
BASOPHILS # BLD AUTO: 0 10E3/UL (ref 0–0.2)
BASOPHILS NFR BLD AUTO: 0 %
BILIRUB SERPL-MCNC: 0.2 MG/DL
BILIRUB UR QL STRIP: NEGATIVE
BUN SERPL-MCNC: 10.5 MG/DL (ref 6–20)
CALCIUM SERPL-MCNC: 8.9 MG/DL (ref 8.8–10.4)
CHLORIDE SERPL-SCNC: 106 MMOL/L (ref 98–107)
COLOR UR AUTO: YELLOW
CREAT SERPL-MCNC: 1.12 MG/DL (ref 0.51–0.95)
EGFRCR SERPLBLD CKD-EPI 2021: 66 ML/MIN/1.73M2
EOSINOPHIL # BLD AUTO: 0.3 10E3/UL (ref 0–0.7)
EOSINOPHIL NFR BLD AUTO: 4 %
ERYTHROCYTE [DISTWIDTH] IN BLOOD BY AUTOMATED COUNT: 12.2 % (ref 10–15)
GLUCOSE SERPL-MCNC: 91 MG/DL (ref 70–99)
GLUCOSE UR STRIP-MCNC: NEGATIVE MG/DL
HCG SERPL QL: NEGATIVE
HCO3 SERPL-SCNC: 23 MMOL/L (ref 22–29)
HCT VFR BLD AUTO: 31.8 % (ref 35–47)
HGB BLD-MCNC: 10.5 G/DL (ref 11.7–15.7)
HGB UR QL STRIP: ABNORMAL
HIV 1+2 AB+HIV1 P24 AG SERPL QL IA: NONREACTIVE
IMM GRANULOCYTES # BLD: 0 10E3/UL
IMM GRANULOCYTES NFR BLD: 0 %
INR PPP: 1.07 (ref 0.85–1.15)
KETONES UR STRIP-MCNC: NEGATIVE MG/DL
LEUKOCYTE ESTERASE UR QL STRIP: ABNORMAL
LYMPHOCYTES # BLD AUTO: 1.6 10E3/UL (ref 0.8–5.3)
LYMPHOCYTES NFR BLD AUTO: 21 %
MCH RBC QN AUTO: 31 PG (ref 26.5–33)
MCHC RBC AUTO-ENTMCNC: 33 G/DL (ref 31.5–36.5)
MCV RBC AUTO: 94 FL (ref 78–100)
MONOCYTES # BLD AUTO: 0.5 10E3/UL (ref 0–1.3)
MONOCYTES NFR BLD AUTO: 6 %
NEUTROPHILS # BLD AUTO: 5.1 10E3/UL (ref 1.6–8.3)
NEUTROPHILS NFR BLD AUTO: 68 %
NITRATE UR QL: NEGATIVE
PH UR STRIP: 7 [PH] (ref 5–8)
PLATELET # BLD AUTO: 322 10E3/UL (ref 150–450)
POTASSIUM SERPL-SCNC: 4.5 MMOL/L (ref 3.4–5.3)
PROT SERPL-MCNC: 7.2 G/DL (ref 6.4–8.3)
RBC # BLD AUTO: 3.39 10E6/UL (ref 3.8–5.2)
RBC #/AREA URNS AUTO: ABNORMAL /HPF
SODIUM SERPL-SCNC: 138 MMOL/L (ref 135–145)
SP GR UR STRIP: 1.01 (ref 1–1.03)
SQUAMOUS #/AREA URNS AUTO: ABNORMAL /LPF
UROBILINOGEN UR STRIP-ACNC: 0.2 E.U./DL
WBC # BLD AUTO: 7.5 10E3/UL (ref 4–11)
WBC #/AREA URNS AUTO: ABNORMAL /HPF

## 2024-11-06 PROCEDURE — 80053 COMPREHEN METABOLIC PANEL: CPT

## 2024-11-06 PROCEDURE — 85610 PROTHROMBIN TIME: CPT

## 2024-11-06 PROCEDURE — 85730 THROMBOPLASTIN TIME PARTIAL: CPT

## 2024-11-06 PROCEDURE — 36415 COLL VENOUS BLD VENIPUNCTURE: CPT

## 2024-11-06 PROCEDURE — 81001 URINALYSIS AUTO W/SCOPE: CPT

## 2024-11-06 PROCEDURE — 84703 CHORIONIC GONADOTROPIN ASSAY: CPT

## 2024-11-06 PROCEDURE — 87389 HIV-1 AG W/HIV-1&-2 AB AG IA: CPT

## 2024-11-06 PROCEDURE — 85025 COMPLETE CBC W/AUTO DIFF WBC: CPT

## 2024-11-11 ENCOUNTER — TELEPHONE (OUTPATIENT)
Dept: FAMILY MEDICINE | Facility: CLINIC | Age: 34
End: 2024-11-11

## 2024-11-11 NOTE — TELEPHONE ENCOUNTER
She is on my schedule this afternoon for a pre-op  [place].  She needs to see our [facility] for her pre-op appt.

## 2024-12-02 ENCOUNTER — TELEPHONE (OUTPATIENT)
Dept: FAMILY MEDICINE | Facility: CLINIC | Age: 34
End: 2024-12-02
Payer: COMMERCIAL

## 2024-12-02 NOTE — TELEPHONE ENCOUNTER
General Call      Reason for Call:  call back    What are your questions or concerns:  Patient had labs on 11/06/24 and hemoglobin was low and is wondering if she can get prescribed a medication or if she should be seen again    Date of last appointment with provider: n/a    Could we send this information to you in [employer] or would you prefer to receive a phone call?:   Patient would prefer a phone call   Okay to leave a detailed message?: Yes at Cell number on file:    Telephone Information:   Mobile [phone]

## 2024-12-11 NOTE — TELEPHONE ENCOUNTER
Appointment scheduled on 12/20/24 with Dr So. Patient can discuss labs at appointment.   Labs not ordered by PCP, Dr Dotson or [facility] provider    Katia Coates RN  [facility]

## 2024-12-20 ENCOUNTER — ANCILLARY PROCEDURE (OUTPATIENT)
Dept: GENERAL RADIOLOGY | Facility: CLINIC | Age: 34
End: 2024-12-20
Attending: FAMILY MEDICINE
Payer: COMMERCIAL

## 2024-12-20 DIAGNOSIS — Z01.818 PREOP GENERAL PHYSICAL EXAM: ICD-10-CM

## 2024-12-20 PROBLEM — D64.9 NORMOCYTIC ANEMIA: Status: ACTIVE | Noted: 2024-12-20

## 2024-12-20 PROBLEM — R79.89 ELEVATED SERUM CREATININE: Status: ACTIVE | Noted: 2024-12-20

## 2024-12-20 PROBLEM — Z91.09 ENVIRONMENTAL ALLERGIES: Status: ACTIVE | Noted: 2024-12-20

## 2024-12-20 PROCEDURE — 71046 X-RAY EXAM CHEST 2 VIEWS: CPT | Mod: TC | Performed by: RADIOLOGY

## 2024-12-23 ENCOUNTER — TELEPHONE (OUTPATIENT)
Dept: FAMILY MEDICINE | Facility: CLINIC | Age: 34
End: 2024-12-23
Payer: COMMERCIAL

## 2024-12-23 DIAGNOSIS — D69.9 TENDENCY TOWARD BLEEDING EASILY (H): ICD-10-CM

## 2024-12-23 DIAGNOSIS — Z01.818 PREOP GENERAL PHYSICAL EXAM: Primary | ICD-10-CM

## 2024-12-23 DIAGNOSIS — Z92.89 HISTORY OF BLOOD TRANSFUSION: ICD-10-CM

## 2024-12-23 DIAGNOSIS — R23.3 EASY BRUISING: ICD-10-CM

## 2024-12-23 NOTE — TELEPHONE ENCOUNTER
----- Message from Noreen STOUT sent at 12/23/2024 11:09 AM CST -----  Regarding: Hematology referral  Good Morning Dr. So,     I received and reviewed the referral for Kitty this morning.  Unfortunately, we would not be able to see her during the requested 1-2 week timeline.  She would need to be seen in [place] at [facility] based on the sickle cell trait.     Even without the sickle cell trait the next available non-malignant hematology slot at any location is 8-12 weeks out.      At this time we cannot accommodate urgent heme consult for cosmetic surgery not r/t cancer/accidents, if Kitty is interested, she can be referred to an outside system like [facility].  I will call her and would be happy to place an external referral if needed.     Thank you,     Noreen Fatima, BSN, RN  Hematology/Oncology Nurse Navigator  [facility]

## 2024-12-23 NOTE — TELEPHONE ENCOUNTER
Please assist the patient with scheduling with [facility] for ASAP due to need for preoperative evaluation.

## 2024-12-27 ENCOUNTER — TRANSFERRED RECORDS (OUTPATIENT)
Dept: HEALTH INFORMATION MANAGEMENT | Facility: CLINIC | Age: 34
End: 2024-12-27
Payer: COMMERCIAL

## 2024-12-30 ENCOUNTER — TELEPHONE (OUTPATIENT)
Dept: FAMILY MEDICINE | Facility: CLINIC | Age: 34
End: 2024-12-30
Payer: COMMERCIAL

## 2024-12-30 NOTE — TELEPHONE ENCOUNTER
----- Message from Yolanda So sent at 12/30/2024  3:55 PM CST -----  Your labs confirm:  1) You have sickle cell trait. Follow up with hematology about your blood tests.   2) Your kidney function is in the normal range and should not be a problem for surgery.     Kitty was referred to [facility] for follow up of possible bleeding disorder. I haven't received records yet. Has she been seen there yet?

## 2024-12-30 NOTE — LETTER
2025    Re: Kitty Bueno  : 1990     To Whom It May Concern:    Kitty Bueno was seen for pre-operative examination on 2024. At that time, it was noted that she had a history of significant postoperative bleeding with prior surgeries. Labs were obtained and consultation was obtained from hematology. After this evaluation, it is my opinion that the patient may proceed with the planned surgery without further evaluation.     Sincerely,        Yolanda So M.D.  Family Physician             Attachment, excerpt from 24 hematology consultation:

## 2024-12-31 NOTE — TELEPHONE ENCOUNTER
Forms/Letter Request    Type of form/letter: OTHER: Medical Clearance Letter       Do we have the form/letter: Yes: Clinic needs to revise Medical Clearance letter due to missing Test results.    Who is the form from? Clinic created letter (if other please explain)    Where did/will the form come from? form was faxed in [facility] faxed letter to facility    When is form/letter needed by: Asap    How would you like the form/letter returned: Fax : Clinic has number    Patient Notified form requests are processed in 5-7 business days:Yes    Could we send this information to you in [employer] or would you prefer to receive a phone call?:   Patient would like to be contacted via "[employer]

## 2024-12-31 NOTE — TELEPHONE ENCOUNTER
Pre-Op exam and lab work faxed to [facility] at [phone]. Patient informed via [employer]. Completing task.

## 2025-01-02 NOTE — TELEPHONE ENCOUNTER
Letter and Oncology Report printed and faxed to [facility] at [phone]. Completing task.  Courtney Ulrich

## 2025-01-02 NOTE — TELEPHONE ENCOUNTER
Dr. So: Patient's surgeon is requesting a letter from you that you have medically cleared her for this surgery. Would you be willing to write this for her?

## 2025-01-02 NOTE — TELEPHONE ENCOUNTER
Note written and in Epic. Please forward my letter and the hematology consultation to the surgeon.

## 2025-01-07 ENCOUNTER — TELEPHONE (OUTPATIENT)
Dept: FAMILY MEDICINE | Facility: CLINIC | Age: 35
End: 2025-01-07
Payer: COMMERCIAL

## 2025-01-07 NOTE — TELEPHONE ENCOUNTER
Order/Referral Request    Who is requesting: [facility]    Orders being requested: Prothrombin Time Blood Test    Reason service is needed/diagnosis: patient has surgery on 2/5/2025 but they need this blood test     When are orders needed by: ASAP    Has this been discussed with Provider: Yes    Does patient have a preference on a Group/Provider/Facility? [facility]    Does patient have an appointment scheduled?: No    Where to send orders: Place orders within Epic - results will then need to be faxed to [facility] at [phone]    Could we send this information to you in [facility] or would you prefer to receive a phone call?:   Patient would prefer a phone call   Okay to leave a detailed message?: Yes at Home number on file [phone] (home)

## 2025-01-07 NOTE — TELEPHONE ENCOUNTER
Pt was seen by hematology/oncology and transferred records available via chart. Clinician aware.    ALEXIS DennyN, PHN, AMB-BC (she/her)  [facility] RN

## 2025-01-08 ENCOUNTER — LAB (OUTPATIENT)
Dept: LAB | Facility: CLINIC | Age: 35
End: 2025-01-08
Payer: COMMERCIAL

## 2025-01-08 DIAGNOSIS — Z01.818 PREOP GENERAL PHYSICAL EXAM: Primary | ICD-10-CM

## 2025-01-08 DIAGNOSIS — Z01.818 PREOP GENERAL PHYSICAL EXAM: ICD-10-CM

## 2025-01-08 LAB — INR PPP: 1.09 (ref 0.85–1.15)

## 2025-01-09 NOTE — TELEPHONE ENCOUNTER
Lab completed on 1/8/2025. Per  Brittny who called surgery center, coordinator confirms that patient is cleared for surgery.

## 2025-01-28 ENCOUNTER — TELEPHONE (OUTPATIENT)
Dept: FAMILY MEDICINE | Facility: CLINIC | Age: 35
End: 2025-01-28
Payer: COMMERCIAL

## 2025-01-28 ENCOUNTER — MYC MEDICAL ADVICE (OUTPATIENT)
Dept: FAMILY MEDICINE | Facility: CLINIC | Age: 35
End: 2025-01-28
Payer: COMMERCIAL

## 2025-01-28 NOTE — TELEPHONE ENCOUNTER
Patient Returning Call    Reason for call:  Patient was informed she needs an iron infusion done before procedure on 02/05/2025. Needs orders placed for that.     Information relayed to patient:  Patient is aware it may take 1-2 business days to hear a response.     Patient has additional questions:  No      Could we send this information to you in [employer] or would you prefer to receive a phone call?:   Patient would prefer a phone call   Okay to leave a detailed message?: Yes at Cell number on file:    Telephone Information:   Mobile [phone]

## 2025-01-29 NOTE — TELEPHONE ENCOUNTER
Unfortunately, not able to order those iron infusions.  Iron levels on 12/20/2024 suggest that the level of iron is normal.  Therefore, there is no indication to infuse iron.    There are other causes of low hemoglobin (other than low iron), which can be investigated, for which an appointment with primary care or with hematology can be made.    There seems to be a high degree of difficulty in the surgical site to approve the procedure.  I think we should take that seriously and consider not proceeding.    I am happy to send a referral to our local plastic surgeons, if desired.

## 2025-01-29 NOTE — TELEPHONE ENCOUNTER
Relayed clinician message via phone, patient verbalizes understanding.  Patient was already referred to hematology by Dr. So, is working with provider at [facility] to investigate low hemoglobin.  Patient will consider Dr. Dotson's message, she will contact clinic with any additional needs/requests.    Stefanie Taylor RN  [facility]

## 2025-01-29 NOTE — TELEPHONE ENCOUNTER
Multiple My Chart messages.  Pended therapy plan for iron and sent to Dr Dotson for review.    Katia Coates RN  [facility]

## 2025-01-30 NOTE — TELEPHONE ENCOUNTER
See Dr Reid's message in 1/28/25 TE and nurse's note. No further action needed.     Kate PRAKASH RN  [facility]

## 2025-03-26 ENCOUNTER — PATIENT OUTREACH (OUTPATIENT)
Dept: FAMILY MEDICINE | Facility: CLINIC | Age: 35
End: 2025-03-26
Payer: COMMERCIAL